# Patient Record
Sex: MALE | Race: WHITE | NOT HISPANIC OR LATINO | Employment: OTHER | URBAN - METROPOLITAN AREA
[De-identification: names, ages, dates, MRNs, and addresses within clinical notes are randomized per-mention and may not be internally consistent; named-entity substitution may affect disease eponyms.]

---

## 2017-09-30 ENCOUNTER — APPOINTMENT (OUTPATIENT)
Dept: RADIOLOGY | Facility: MEDICAL CENTER | Age: 37
End: 2017-09-30
Attending: EMERGENCY MEDICINE
Payer: COMMERCIAL

## 2017-09-30 ENCOUNTER — HOSPITAL ENCOUNTER (EMERGENCY)
Facility: MEDICAL CENTER | Age: 37
End: 2017-10-01
Attending: EMERGENCY MEDICINE
Payer: COMMERCIAL

## 2017-09-30 DIAGNOSIS — R11.2 NON-INTRACTABLE VOMITING WITH NAUSEA, UNSPECIFIED VOMITING TYPE: ICD-10-CM

## 2017-09-30 DIAGNOSIS — K43.9 VENTRAL HERNIA WITHOUT OBSTRUCTION OR GANGRENE: ICD-10-CM

## 2017-09-30 DIAGNOSIS — R10.13 EPIGASTRIC PAIN: ICD-10-CM

## 2017-09-30 DIAGNOSIS — F10.10 ALCOHOL ABUSE: ICD-10-CM

## 2017-09-30 LAB
ALBUMIN SERPL BCP-MCNC: 4.8 G/DL (ref 3.2–4.9)
ALBUMIN/GLOB SERPL: 1.5 G/DL
ALP SERPL-CCNC: 98 U/L (ref 30–99)
ALT SERPL-CCNC: 36 U/L (ref 2–50)
ANION GAP SERPL CALC-SCNC: 21 MMOL/L (ref 0–11.9)
AST SERPL-CCNC: 26 U/L (ref 12–45)
BASOPHILS # BLD AUTO: 0.4 % (ref 0–1.8)
BASOPHILS # BLD: 0.04 K/UL (ref 0–0.12)
BILIRUB SERPL-MCNC: 2.7 MG/DL (ref 0.1–1.5)
BUN SERPL-MCNC: 11 MG/DL (ref 8–22)
CALCIUM SERPL-MCNC: 10.1 MG/DL (ref 8.5–10.5)
CHLORIDE SERPL-SCNC: 101 MMOL/L (ref 96–112)
CO2 SERPL-SCNC: 17 MMOL/L (ref 20–33)
CREAT SERPL-MCNC: 0.71 MG/DL (ref 0.5–1.4)
EOSINOPHIL # BLD AUTO: 0 K/UL (ref 0–0.51)
EOSINOPHIL NFR BLD: 0 % (ref 0–6.9)
ERYTHROCYTE [DISTWIDTH] IN BLOOD BY AUTOMATED COUNT: 47.3 FL (ref 35.9–50)
GFR SERPL CREATININE-BSD FRML MDRD: >60 ML/MIN/1.73 M 2
GLOBULIN SER CALC-MCNC: 3.1 G/DL (ref 1.9–3.5)
GLUCOSE SERPL-MCNC: 81 MG/DL (ref 65–99)
HCT VFR BLD AUTO: 42.1 % (ref 42–52)
HGB BLD-MCNC: 13.7 G/DL (ref 14–18)
IMM GRANULOCYTES # BLD AUTO: 0.07 K/UL (ref 0–0.11)
IMM GRANULOCYTES NFR BLD AUTO: 0.6 % (ref 0–0.9)
INR PPP: 1.09 (ref 0.87–1.13)
LIPASE SERPL-CCNC: 16 U/L (ref 11–82)
LYMPHOCYTES # BLD AUTO: 1.09 K/UL (ref 1–4.8)
LYMPHOCYTES NFR BLD: 10 % (ref 22–41)
MCH RBC QN AUTO: 24.1 PG (ref 27–33)
MCHC RBC AUTO-ENTMCNC: 32.5 G/DL (ref 33.7–35.3)
MCV RBC AUTO: 74 FL (ref 81.4–97.8)
MONOCYTES # BLD AUTO: 0.49 K/UL (ref 0–0.85)
MONOCYTES NFR BLD AUTO: 4.5 % (ref 0–13.4)
NEUTROPHILS # BLD AUTO: 9.22 K/UL (ref 1.82–7.42)
NEUTROPHILS NFR BLD: 84.5 % (ref 44–72)
NRBC # BLD AUTO: 0 K/UL
NRBC BLD AUTO-RTO: 0 /100 WBC
PLATELET # BLD AUTO: 314 K/UL (ref 164–446)
PMV BLD AUTO: 10.1 FL (ref 9–12.9)
POTASSIUM SERPL-SCNC: 3.8 MMOL/L (ref 3.6–5.5)
PROT SERPL-MCNC: 7.9 G/DL (ref 6–8.2)
PROTHROMBIN TIME: 14.4 SEC (ref 12–14.6)
RBC # BLD AUTO: 5.69 M/UL (ref 4.7–6.1)
SODIUM SERPL-SCNC: 139 MMOL/L (ref 135–145)
WBC # BLD AUTO: 10.9 K/UL (ref 4.8–10.8)

## 2017-09-30 PROCEDURE — 83690 ASSAY OF LIPASE: CPT

## 2017-09-30 PROCEDURE — 80053 COMPREHEN METABOLIC PANEL: CPT

## 2017-09-30 PROCEDURE — 96374 THER/PROPH/DIAG INJ IV PUSH: CPT

## 2017-09-30 PROCEDURE — 96361 HYDRATE IV INFUSION ADD-ON: CPT

## 2017-09-30 PROCEDURE — 36415 COLL VENOUS BLD VENIPUNCTURE: CPT

## 2017-09-30 PROCEDURE — 96375 TX/PRO/DX INJ NEW DRUG ADDON: CPT

## 2017-09-30 PROCEDURE — 99284 EMERGENCY DEPT VISIT MOD MDM: CPT

## 2017-09-30 PROCEDURE — 85610 PROTHROMBIN TIME: CPT

## 2017-09-30 PROCEDURE — 85025 COMPLETE CBC W/AUTO DIFF WBC: CPT

## 2017-09-30 PROCEDURE — 700111 HCHG RX REV CODE 636 W/ 250 OVERRIDE (IP): Performed by: EMERGENCY MEDICINE

## 2017-09-30 PROCEDURE — 96376 TX/PRO/DX INJ SAME DRUG ADON: CPT

## 2017-09-30 PROCEDURE — 700105 HCHG RX REV CODE 258: Performed by: EMERGENCY MEDICINE

## 2017-09-30 RX ORDER — METOCLOPRAMIDE HYDROCHLORIDE 5 MG/ML
10 INJECTION INTRAMUSCULAR; INTRAVENOUS ONCE
Status: COMPLETED | OUTPATIENT
Start: 2017-09-30 | End: 2017-09-30

## 2017-09-30 RX ORDER — MORPHINE SULFATE 4 MG/ML
4 INJECTION, SOLUTION INTRAMUSCULAR; INTRAVENOUS ONCE
Status: COMPLETED | OUTPATIENT
Start: 2017-09-30 | End: 2017-09-30

## 2017-09-30 RX ORDER — ONDANSETRON 2 MG/ML
4 INJECTION INTRAMUSCULAR; INTRAVENOUS ONCE
Status: COMPLETED | OUTPATIENT
Start: 2017-09-30 | End: 2017-09-30

## 2017-09-30 RX ORDER — SODIUM CHLORIDE 9 MG/ML
1000 INJECTION, SOLUTION INTRAVENOUS ONCE
Status: COMPLETED | OUTPATIENT
Start: 2017-09-30 | End: 2017-09-30

## 2017-09-30 RX ADMIN — METOCLOPRAMIDE 10 MG: 5 INJECTION, SOLUTION INTRAMUSCULAR; INTRAVENOUS at 22:02

## 2017-09-30 RX ADMIN — SODIUM CHLORIDE 1000 ML: 9 INJECTION, SOLUTION INTRAVENOUS at 22:09

## 2017-09-30 RX ADMIN — ONDANSETRON 4 MG: 2 INJECTION INTRAMUSCULAR; INTRAVENOUS at 20:44

## 2017-09-30 RX ADMIN — SODIUM CHLORIDE 1000 ML: 9 INJECTION, SOLUTION INTRAVENOUS at 20:43

## 2017-09-30 RX ADMIN — MORPHINE SULFATE 4 MG: 4 INJECTION INTRAVENOUS at 20:45

## 2017-09-30 RX ADMIN — ONDANSETRON 4 MG: 2 INJECTION INTRAMUSCULAR; INTRAVENOUS at 22:45

## 2017-09-30 RX ADMIN — ONDANSETRON 4 MG: 2 INJECTION INTRAMUSCULAR; INTRAVENOUS at 22:35

## 2017-09-30 RX ADMIN — MORPHINE SULFATE 4 MG: 4 INJECTION INTRAVENOUS at 22:02

## 2017-10-01 VITALS
BODY MASS INDEX: 44.1 KG/M2 | HEART RATE: 88 BPM | TEMPERATURE: 98 F | DIASTOLIC BLOOD PRESSURE: 73 MMHG | HEIGHT: 71 IN | OXYGEN SATURATION: 96 % | RESPIRATION RATE: 18 BRPM | WEIGHT: 315 LBS | SYSTOLIC BLOOD PRESSURE: 109 MMHG

## 2017-10-01 PROCEDURE — 74177 CT ABD & PELVIS W/CONTRAST: CPT

## 2017-10-01 PROCEDURE — 700117 HCHG RX CONTRAST REV CODE 255: Performed by: EMERGENCY MEDICINE

## 2017-10-01 RX ORDER — ONDANSETRON 4 MG/1
4 TABLET, ORALLY DISINTEGRATING ORAL EVERY 8 HOURS PRN
Qty: 10 TAB | Refills: 0 | Status: SHIPPED | OUTPATIENT
Start: 2017-10-01 | End: 2020-01-21

## 2017-10-01 RX ORDER — OMEPRAZOLE 20 MG/1
20 CAPSULE, DELAYED RELEASE ORAL 2 TIMES DAILY
Qty: 60 CAP | Refills: 0 | Status: SHIPPED | OUTPATIENT
Start: 2017-10-01 | End: 2017-10-31

## 2017-10-01 RX ORDER — ONDANSETRON 4 MG/1
4 TABLET, ORALLY DISINTEGRATING ORAL EVERY 8 HOURS PRN
Qty: 10 TAB | Refills: 0 | Status: SHIPPED | OUTPATIENT
Start: 2017-10-01 | End: 2017-10-01

## 2017-10-01 RX ORDER — OMEPRAZOLE 20 MG/1
20 CAPSULE, DELAYED RELEASE ORAL 2 TIMES DAILY
Qty: 60 CAP | Refills: 0 | Status: SHIPPED | OUTPATIENT
Start: 2017-10-01 | End: 2017-10-01

## 2017-10-01 RX ADMIN — IOHEXOL 100 ML: 350 INJECTION, SOLUTION INTRAVENOUS at 00:19

## 2017-10-01 NOTE — ED NOTES
Pt states that he does not feel like he can sit up any more, asking to lay down. Pt assisted to senior margaritae with family, instructed on red phone use for assistance. Pt laying in a chair

## 2017-10-01 NOTE — ED NOTES
Estiven Hills Jr.  37 y.o.  Chief Complaint   Patient presents with   • Nausea/Vomiting/Diarrhea     Pt is end stage liver disease, needs a transplant, was sober for a year and then began drinking the last 2 days and is now suffering with n/v/d.      Pt is pale and hyperventilating in triage, states that he is afraid that he might seize. States that he has drank just over 2 gallons of whiskey in the last 48 hours. States that he has had withdrawal seizures in the past. Charge RN aware of pt.

## 2017-10-01 NOTE — ED NOTES
Pt stated that he was extremely nauseous and his stomach felt like it was on fire. 9/10 pain. ERP  Notified. Orders received. Pt medicated per MAR.

## 2017-10-01 NOTE — ED NOTES
Pt in room, changed in to gown. Monitors in place, VSS, call bell within reach, will continue to monitor.

## 2017-10-01 NOTE — DISCHARGE INSTRUCTIONS
Ventral Hernia  A ventral hernia (also called an incisional hernia) is a hernia that occurs at the site of a previous surgical cut (incision) in the abdomen. The abdominal wall spans from your lower chest down to your pelvis. If the abdominal wall is weakened from a surgical incision, a hernia can occur. A hernia is a bulge of bowel or muscle tissue pushing out on the weakened part of the abdominal wall. Ventral hernias can get bigger from straining or lifting.  Obese and older people are at higher risk for a ventral hernia. People who develop infections after surgery or require repeat incisions at the same site on the abdomen are also at increased risk.  CAUSES   A ventral hernia occurs because of weakness in the abdominal wall at an incision site.   SYMPTOMS   Common symptoms include:  · A visible bulge or lump on the abdominal wall.  · Pain or tenderness around the lump.  · Increased discomfort if you cough or make a sudden movement.  If the hernia has blocked part of the intestine, a serious complication can occur (incarcerated or strangulated hernia). This can become a problem that requires emergency surgery because the blood flow to the blocked intestine may be cut off. Symptoms may include:  · Feeling sick to your stomach (nauseous).  · Throwing up (vomiting).  · Stomach swelling (distention) or bloating.  · Fever.  · Rapid heartbeat.  DIAGNOSIS   Your health care provider will take a medical history and perform a physical exam. Various tests may be ordered, such as:  · Blood tests.  · Urine tests.  · Ultrasonography.  · X-rays.  · Computed tomography (CT).  TREATMENT   Watchful waiting may be all that is needed for a smaller hernia that does not cause symptoms. Your health care provider may recommend the use of a supportive belt (truss) that helps to keep the abdominal wall intact. For larger hernias or those that cause pain, surgery to repair the hernia is usually recommended. If a hernia becomes  strangulated, emergency surgery needs to be done right away.  HOME CARE INSTRUCTIONS  · Avoid putting pressure or strain on the abdominal area.  · Avoid heavy lifting.  · Use good body positioning for physical tasks. Ask your health care provider about proper body positioning.  · Use a supportive belt as directed by your health care provider.  · Maintain a healthy weight.  · Eat foods that are high in fiber, such as whole grains, fruits, and vegetables. Fiber helps prevent difficult bowel movements (constipation).  · Drink enough fluids to keep your urine clear or pale yellow.  · Follow up with your health care provider as directed.  SEEK MEDICAL CARE IF:   · Your hernia seems to be getting larger or more painful.  SEEK IMMEDIATE MEDICAL CARE IF:   · You have abdominal pain that is sudden and sharp.  · Your pain becomes severe.  · You have repeated vomiting.  · You are sweating a lot.  · You notice a rapid heartbeat.  · You develop a fever.  MAKE SURE YOU:   · Understand these instructions.  · Will watch your condition.  · Will get help right away if you are not doing well or get worse.     This information is not intended to replace advice given to you by your health care provider. Make sure you discuss any questions you have with your health care provider.     Document Released: 12/04/2013 Document Revised: 01/08/2016 Document Reviewed: 12/04/2013  [employer] Interactive Patient Education ©2016 [employer]  Alcoholic Gastritis  You have alcoholic gastritis. This is an inflammation of the lining of the stomach. It is caused by drinking alcohol. The symptoms may include: burning abdominal pain, nausea, vomiting or even vomiting blood. It can be made worse by a poor diet. People who drink frequently often do not eat well. Taking aspirin or other anti-inflammatory medications increases stomach irritation and bleeding. These medicines should be avoided.  Treatment is aimed at the cause. You have to stop drinking  alcohol if you want to get better. Eat a healthy, well-balanced diet. You may take liquid antacids as needed. Your caregiver may prescribe medications to help heal the stomach lining. Take these as prescribed.  PREVENTION   · Anyone who has experienced alcoholic gastritis should consider that alcoholism may be an issue. Professional evaluation is highly recommended.   · Although some people can recover without help, most need assistance. With treatment and support, many are able to stop drinking and rebuild their lives. Long-term recovery is possible.   · Alcohol Addiction cannot be cured, but it can be treated successfully. Treatment centers are listed in telephone listings under:   · Alcoholism and Addiction Treatment; Substance Abuse Treatment or Cocaine, Narcotics and Alcoholics Anonymous. Most hospitals and clinics can refer you to a specialized care center.   · The U[place] government maintains a toll-free number for treatment referrals: 1-127.973.3801 or 1-460.159.3818 (TDD). They also maintain a website: http://findtreatment.samhsa.gov. Other websites for more information are: www.mentalhealth.samhsa.gov and www.teri.gov.   · In [place], treatment resources are listed in each province. Listings are available under:   · [employer] or similar titles.   SEEK IMMEDIATE MEDICAL CARE IF:   · You develop severe abdominal pain, uncontrolled vomiting, or vomiting blood.   · You blackout or have fainting spells.   · You develop seizures (this could be life threatening).   · You develop bloody stools or stools that appear black or tarry.   Document Released: 01/25/2006 Document Revised: 03/11/2013 Document Reviewed: 12/22/2010  [employer]® Patient Information ©2013 [employer].

## 2018-04-23 ENCOUNTER — TELEPHONE (OUTPATIENT)
Dept: VASCULAR LAB | Facility: MEDICAL CENTER | Age: 38
End: 2018-04-23

## 2018-04-23 NOTE — TELEPHONE ENCOUNTER
Received a referral from [facility].  There is nothing in the chart note that was sent with referral to indicate that pt's on any anticoagulation.   Called and left msg for MA to clarify the referral.     Vivienne Espinoza, KetanD

## 2020-01-20 ENCOUNTER — APPOINTMENT (OUTPATIENT)
Dept: RADIOLOGY | Facility: MEDICAL CENTER | Age: 40
DRG: 896 | End: 2020-01-20
Attending: EMERGENCY MEDICINE
Payer: MEDICARE

## 2020-01-20 ENCOUNTER — HOSPITAL ENCOUNTER (INPATIENT)
Facility: MEDICAL CENTER | Age: 40
LOS: 2 days | DRG: 896 | End: 2020-01-23
Attending: EMERGENCY MEDICINE | Admitting: HOSPITALIST
Payer: MEDICARE

## 2020-01-20 DIAGNOSIS — R94.31 PROLONGED Q-T INTERVAL ON ECG: ICD-10-CM

## 2020-01-20 DIAGNOSIS — E87.6 HYPOKALEMIA: ICD-10-CM

## 2020-01-20 DIAGNOSIS — F15.10 METHAMPHETAMINE ABUSE (HCC): ICD-10-CM

## 2020-01-20 DIAGNOSIS — R09.89 CHOKING SENSATION: ICD-10-CM

## 2020-01-20 DIAGNOSIS — R79.89 INCREASED AMMONIA LEVEL: ICD-10-CM

## 2020-01-20 DIAGNOSIS — R44.3 HALLUCINATIONS: ICD-10-CM

## 2020-01-20 LAB
ALBUMIN SERPL BCP-MCNC: 3.7 G/DL (ref 3.2–4.9)
ALBUMIN/GLOB SERPL: 1.8 G/DL
ALP SERPL-CCNC: 118 U/L (ref 30–99)
ALT SERPL-CCNC: 60 U/L (ref 2–50)
AMMONIA PLAS-SCNC: 49 UMOL/L (ref 11–45)
ANION GAP SERPL CALC-SCNC: 11 MMOL/L (ref 0–11.9)
AST SERPL-CCNC: 88 U/L (ref 12–45)
BASOPHILS # BLD AUTO: 1 % (ref 0–1.8)
BASOPHILS # BLD: 0.04 K/UL (ref 0–0.12)
BILIRUB SERPL-MCNC: 4.2 MG/DL (ref 0.1–1.5)
BUN SERPL-MCNC: 9 MG/DL (ref 8–22)
CALCIUM SERPL-MCNC: 8.8 MG/DL (ref 8.5–10.5)
CHLORIDE SERPL-SCNC: 98 MMOL/L (ref 96–112)
CO2 SERPL-SCNC: 25 MMOL/L (ref 20–33)
CREAT SERPL-MCNC: 0.67 MG/DL (ref 0.5–1.4)
EOSINOPHIL # BLD AUTO: 0.08 K/UL (ref 0–0.51)
EOSINOPHIL NFR BLD: 2.1 % (ref 0–6.9)
ERYTHROCYTE [DISTWIDTH] IN BLOOD BY AUTOMATED COUNT: 53.8 FL (ref 35.9–50)
ETHANOL BLD-MCNC: 0.01 G/DL
GLOBULIN SER CALC-MCNC: 2.1 G/DL (ref 1.9–3.5)
GLUCOSE SERPL-MCNC: 76 MG/DL (ref 65–99)
HCT VFR BLD AUTO: 36.4 % (ref 42–52)
HGB BLD-MCNC: 12.8 G/DL (ref 14–18)
IMM GRANULOCYTES # BLD AUTO: 0.01 K/UL (ref 0–0.11)
IMM GRANULOCYTES NFR BLD AUTO: 0.3 % (ref 0–0.9)
LYMPHOCYTES # BLD AUTO: 1.14 K/UL (ref 1–4.8)
LYMPHOCYTES NFR BLD: 29.2 % (ref 22–41)
MAGNESIUM SERPL-MCNC: 1.6 MG/DL (ref 1.5–2.5)
MCH RBC QN AUTO: 37.6 PG (ref 27–33)
MCHC RBC AUTO-ENTMCNC: 35.2 G/DL (ref 33.7–35.3)
MCV RBC AUTO: 107.1 FL (ref 81.4–97.8)
MONOCYTES # BLD AUTO: 0.37 K/UL (ref 0–0.85)
MONOCYTES NFR BLD AUTO: 9.5 % (ref 0–13.4)
NEUTROPHILS # BLD AUTO: 2.26 K/UL (ref 1.82–7.42)
NEUTROPHILS NFR BLD: 57.9 % (ref 44–72)
NRBC # BLD AUTO: 0 K/UL
NRBC BLD-RTO: 0 /100 WBC
PHOSPHATE SERPL-MCNC: 3.1 MG/DL (ref 2.5–4.5)
PLATELET # BLD AUTO: 105 K/UL (ref 164–446)
PMV BLD AUTO: 11.1 FL (ref 9–12.9)
POTASSIUM SERPL-SCNC: 3 MMOL/L (ref 3.6–5.5)
PROT SERPL-MCNC: 5.8 G/DL (ref 6–8.2)
RBC # BLD AUTO: 3.4 M/UL (ref 4.7–6.1)
SODIUM SERPL-SCNC: 134 MMOL/L (ref 135–145)
WBC # BLD AUTO: 3.9 K/UL (ref 4.8–10.8)

## 2020-01-20 PROCEDURE — 80053 COMPREHEN METABOLIC PANEL: CPT

## 2020-01-20 PROCEDURE — 85025 COMPLETE CBC W/AUTO DIFF WBC: CPT

## 2020-01-20 PROCEDURE — 80307 DRUG TEST PRSMV CHEM ANLYZR: CPT

## 2020-01-20 PROCEDURE — 96366 THER/PROPH/DIAG IV INF ADDON: CPT

## 2020-01-20 PROCEDURE — 70360 X-RAY EXAM OF NECK: CPT

## 2020-01-20 PROCEDURE — 96375 TX/PRO/DX INJ NEW DRUG ADDON: CPT

## 2020-01-20 PROCEDURE — 83735 ASSAY OF MAGNESIUM: CPT

## 2020-01-20 PROCEDURE — 99285 EMERGENCY DEPT VISIT HI MDM: CPT

## 2020-01-20 PROCEDURE — 82140 ASSAY OF AMMONIA: CPT

## 2020-01-20 PROCEDURE — 96365 THER/PROPH/DIAG IV INF INIT: CPT

## 2020-01-20 PROCEDURE — 96367 TX/PROPH/DG ADDL SEQ IV INF: CPT

## 2020-01-20 PROCEDURE — 700111 HCHG RX REV CODE 636 W/ 250 OVERRIDE (IP): Performed by: EMERGENCY MEDICINE

## 2020-01-20 PROCEDURE — 93005 ELECTROCARDIOGRAM TRACING: CPT | Performed by: EMERGENCY MEDICINE

## 2020-01-20 PROCEDURE — 84100 ASSAY OF PHOSPHORUS: CPT

## 2020-01-20 PROCEDURE — 96368 THER/DIAG CONCURRENT INF: CPT

## 2020-01-20 RX ORDER — CHLORDIAZEPOXIDE HYDROCHLORIDE 25 MG/1
50 CAPSULE, GELATIN COATED ORAL EVERY 6 HOURS
Status: DISCONTINUED | OUTPATIENT
Start: 2020-01-21 | End: 2020-01-21

## 2020-01-20 RX ORDER — LORAZEPAM 2 MG/ML
1 INJECTION INTRAMUSCULAR
Status: DISCONTINUED | OUTPATIENT
Start: 2020-01-20 | End: 2020-01-21

## 2020-01-20 RX ORDER — FOLIC ACID 1 MG/1
1 TABLET ORAL DAILY
Status: DISCONTINUED | OUTPATIENT
Start: 2020-01-21 | End: 2020-01-21

## 2020-01-20 RX ORDER — CHLORDIAZEPOXIDE HYDROCHLORIDE 25 MG/1
25 CAPSULE, GELATIN COATED ORAL EVERY 6 HOURS
Status: DISCONTINUED | OUTPATIENT
Start: 2020-01-22 | End: 2020-01-21

## 2020-01-20 RX ORDER — LORAZEPAM 1 MG/1
1 TABLET ORAL EVERY 4 HOURS PRN
Status: DISCONTINUED | OUTPATIENT
Start: 2020-01-20 | End: 2020-01-21

## 2020-01-20 RX ORDER — LORAZEPAM 2 MG/1
2 TABLET ORAL
Status: DISCONTINUED | OUTPATIENT
Start: 2020-01-20 | End: 2020-01-21

## 2020-01-20 RX ORDER — LORAZEPAM 2 MG/ML
1.5 INJECTION INTRAMUSCULAR
Status: DISCONTINUED | OUTPATIENT
Start: 2020-01-20 | End: 2020-01-21

## 2020-01-20 RX ORDER — LORAZEPAM 2 MG/ML
0.5 INJECTION INTRAMUSCULAR EVERY 4 HOURS PRN
Status: DISCONTINUED | OUTPATIENT
Start: 2020-01-20 | End: 2020-01-21

## 2020-01-20 RX ORDER — LORAZEPAM 2 MG/ML
0.5 INJECTION INTRAMUSCULAR ONCE
Status: COMPLETED | OUTPATIENT
Start: 2020-01-20 | End: 2020-01-20

## 2020-01-20 RX ORDER — LORAZEPAM 2 MG/ML
2 INJECTION INTRAMUSCULAR
Status: DISCONTINUED | OUTPATIENT
Start: 2020-01-20 | End: 2020-01-21

## 2020-01-20 RX ORDER — LORAZEPAM 1 MG/1
0.5 TABLET ORAL EVERY 4 HOURS PRN
Status: DISCONTINUED | OUTPATIENT
Start: 2020-01-20 | End: 2020-01-21

## 2020-01-20 RX ORDER — THIAMINE MONONITRATE (VIT B1) 100 MG
100 TABLET ORAL DAILY
Status: DISCONTINUED | OUTPATIENT
Start: 2020-01-21 | End: 2020-01-21

## 2020-01-20 RX ORDER — LORAZEPAM 2 MG/1
4 TABLET ORAL
Status: DISCONTINUED | OUTPATIENT
Start: 2020-01-20 | End: 2020-01-21

## 2020-01-20 RX ORDER — HALOPERIDOL 5 MG/1
5 TABLET ORAL ONCE
Status: DISPENSED | OUTPATIENT
Start: 2020-01-20 | End: 2020-01-21

## 2020-01-20 RX ADMIN — LORAZEPAM 0.5 MG: 2 INJECTION INTRAMUSCULAR; INTRAVENOUS at 22:29

## 2020-01-20 ASSESSMENT — LIFESTYLE VARIABLES
ANXIETY: MODERATELY ANXIOUS OR GUARDED, SO ANXIETY IS INFERRED
HAVE YOU EVER FELT YOU SHOULD CUT DOWN ON YOUR DRINKING: YES
PAROXYSMAL SWEATS: BARELY PERCEPTIBLE SWEATING. PALMS MOIST
AGITATION: NORMAL ACTIVITY
VISUAL DISTURBANCES: NOT PRESENT
NAUSEA AND VOMITING: NO NAUSEA AND NO VOMITING
AUDITORY DISTURBANCES: NOT PRESENT
TREMOR: *
DO YOU DRINK ALCOHOL: YES
HAVE PEOPLE ANNOYED YOU BY CRITICIZING YOUR DRINKING: NO
DOES PATIENT WANT TO TALK TO SOMEONE ABOUT QUITTING: YES
TOTAL SCORE: 3
TOTAL SCORE: 3
EVER FELT BAD OR GUILTY ABOUT YOUR DRINKING: YES
EVER HAD A DRINK FIRST THING IN THE MORNING TO STEADY YOUR NERVES TO GET RID OF A HANGOVER: YES
CONSUMPTION TOTAL: INCOMPLETE
TOTAL SCORE: 3
TOTAL SCORE: 7
HEADACHE, FULLNESS IN HEAD: NOT PRESENT
ORIENTATION AND CLOUDING OF SENSORIUM: ORIENTED AND CAN DO SERIAL ADDITIONS
DOES PATIENT WANT TO STOP DRINKING: YES

## 2020-01-21 ENCOUNTER — APPOINTMENT (OUTPATIENT)
Dept: RADIOLOGY | Facility: MEDICAL CENTER | Age: 40
DRG: 896 | End: 2020-01-21
Attending: HOSPITALIST
Payer: MEDICARE

## 2020-01-21 ENCOUNTER — APPOINTMENT (OUTPATIENT)
Dept: RADIOLOGY | Facility: MEDICAL CENTER | Age: 40
DRG: 896 | End: 2020-01-21
Attending: EMERGENCY MEDICINE
Payer: MEDICARE

## 2020-01-21 PROBLEM — R44.3 HALLUCINATIONS: Status: ACTIVE | Noted: 2020-01-21

## 2020-01-21 PROBLEM — R94.31 PROLONGED QT INTERVAL: Status: ACTIVE | Noted: 2020-01-21

## 2020-01-21 PROBLEM — F10.10 ALCOHOL ABUSE: Status: ACTIVE | Noted: 2020-01-21

## 2020-01-21 PROBLEM — R74.01 TRANSAMINITIS: Status: ACTIVE | Noted: 2020-01-21

## 2020-01-21 LAB
AMPHET UR QL SCN: POSITIVE
BARBITURATES UR QL SCN: NEGATIVE
BENZODIAZ UR QL SCN: NEGATIVE
BZE UR QL SCN: NEGATIVE
CANNABINOIDS UR QL SCN: POSITIVE
EKG IMPRESSION: NORMAL
MAGNESIUM SERPL-MCNC: 2.1 MG/DL (ref 1.5–2.5)
METHADONE UR QL SCN: NEGATIVE
OPIATES UR QL SCN: NEGATIVE
OXYCODONE UR QL SCN: NEGATIVE
PCP UR QL SCN: NEGATIVE
PHOSPHATE SERPL-MCNC: 2.8 MG/DL (ref 2.5–4.5)
PROPOXYPH UR QL SCN: NEGATIVE

## 2020-01-21 PROCEDURE — 700101 HCHG RX REV CODE 250: Performed by: HOSPITALIST

## 2020-01-21 PROCEDURE — 74018 RADEX ABDOMEN 1 VIEW: CPT

## 2020-01-21 PROCEDURE — 93005 ELECTROCARDIOGRAM TRACING: CPT | Performed by: HOSPITALIST

## 2020-01-21 PROCEDURE — 93010 ELECTROCARDIOGRAM REPORT: CPT | Performed by: INTERNAL MEDICINE

## 2020-01-21 PROCEDURE — 700102 HCHG RX REV CODE 250 W/ 637 OVERRIDE(OP): Performed by: EMERGENCY MEDICINE

## 2020-01-21 PROCEDURE — 83735 ASSAY OF MAGNESIUM: CPT

## 2020-01-21 PROCEDURE — HZ2ZZZZ DETOXIFICATION SERVICES FOR SUBSTANCE ABUSE TREATMENT: ICD-10-PCS | Performed by: HOSPITALIST

## 2020-01-21 PROCEDURE — 700111 HCHG RX REV CODE 636 W/ 250 OVERRIDE (IP): Performed by: HOSPITALIST

## 2020-01-21 PROCEDURE — 36415 COLL VENOUS BLD VENIPUNCTURE: CPT

## 2020-01-21 PROCEDURE — A9270 NON-COVERED ITEM OR SERVICE: HCPCS | Performed by: EMERGENCY MEDICINE

## 2020-01-21 PROCEDURE — 700102 HCHG RX REV CODE 250 W/ 637 OVERRIDE(OP): Performed by: HOSPITALIST

## 2020-01-21 PROCEDURE — 95819 EEG AWAKE AND ASLEEP: CPT | Mod: 26 | Performed by: PSYCHIATRY & NEUROLOGY

## 2020-01-21 PROCEDURE — 700111 HCHG RX REV CODE 636 W/ 250 OVERRIDE (IP): Performed by: EMERGENCY MEDICINE

## 2020-01-21 PROCEDURE — 71045 X-RAY EXAM CHEST 1 VIEW: CPT

## 2020-01-21 PROCEDURE — 4A10X4Z MONITORING OF CENTRAL NERVOUS ELECTRICAL ACTIVITY, EXTERNAL APPROACH: ICD-10-PCS | Performed by: PSYCHIATRY & NEUROLOGY

## 2020-01-21 PROCEDURE — A9270 NON-COVERED ITEM OR SERVICE: HCPCS | Performed by: HOSPITALIST

## 2020-01-21 PROCEDURE — 95819 EEG AWAKE AND ASLEEP: CPT | Performed by: PSYCHIATRY & NEUROLOGY

## 2020-01-21 PROCEDURE — 700117 HCHG RX CONTRAST REV CODE 255: Performed by: EMERGENCY MEDICINE

## 2020-01-21 PROCEDURE — 99223 1ST HOSP IP/OBS HIGH 75: CPT | Performed by: HOSPITALIST

## 2020-01-21 PROCEDURE — 770020 HCHG ROOM/CARE - TELE (206)

## 2020-01-21 PROCEDURE — 70250 X-RAY EXAM OF SKULL: CPT

## 2020-01-21 PROCEDURE — 84100 ASSAY OF PHOSPHORUS: CPT

## 2020-01-21 PROCEDURE — 70491 CT SOFT TISSUE NECK W/DYE: CPT

## 2020-01-21 RX ORDER — LORAZEPAM 2 MG/ML
2 INJECTION INTRAMUSCULAR ONCE
Status: COMPLETED | OUTPATIENT
Start: 2020-01-21 | End: 2020-01-21

## 2020-01-21 RX ORDER — POTASSIUM CHLORIDE 7.45 MG/ML
10 INJECTION INTRAVENOUS
Status: DISPENSED | OUTPATIENT
Start: 2020-01-21 | End: 2020-01-21

## 2020-01-21 RX ORDER — THIAMINE MONONITRATE (VIT B1) 100 MG
100 TABLET ORAL DAILY
Status: DISCONTINUED | OUTPATIENT
Start: 2020-01-22 | End: 2020-01-23 | Stop reason: HOSPADM

## 2020-01-21 RX ORDER — AMOXICILLIN 250 MG
2 CAPSULE ORAL 2 TIMES DAILY
Status: DISCONTINUED | OUTPATIENT
Start: 2020-01-21 | End: 2020-01-23 | Stop reason: HOSPADM

## 2020-01-21 RX ORDER — LORAZEPAM 2 MG/ML
0.5 INJECTION INTRAMUSCULAR EVERY 4 HOURS PRN
Status: DISCONTINUED | OUTPATIENT
Start: 2020-01-21 | End: 2020-01-23 | Stop reason: HOSPADM

## 2020-01-21 RX ORDER — MAGNESIUM SULFATE HEPTAHYDRATE 40 MG/ML
2 INJECTION, SOLUTION INTRAVENOUS ONCE
Status: COMPLETED | OUTPATIENT
Start: 2020-01-21 | End: 2020-01-21

## 2020-01-21 RX ORDER — LORAZEPAM 2 MG/1
2 TABLET ORAL
Status: DISCONTINUED | OUTPATIENT
Start: 2020-01-21 | End: 2020-01-23 | Stop reason: HOSPADM

## 2020-01-21 RX ORDER — LORAZEPAM 2 MG/ML
1.5 INJECTION INTRAMUSCULAR
Status: DISCONTINUED | OUTPATIENT
Start: 2020-01-21 | End: 2020-01-23 | Stop reason: HOSPADM

## 2020-01-21 RX ORDER — BISACODYL 10 MG
10 SUPPOSITORY, RECTAL RECTAL
Status: DISCONTINUED | OUTPATIENT
Start: 2020-01-21 | End: 2020-01-23 | Stop reason: HOSPADM

## 2020-01-21 RX ORDER — LABETALOL 200 MG/1
200 TABLET, FILM COATED ORAL EVERY 6 HOURS PRN
Status: DISCONTINUED | OUTPATIENT
Start: 2020-01-21 | End: 2020-01-23 | Stop reason: HOSPADM

## 2020-01-21 RX ORDER — POTASSIUM CHLORIDE 20 MEQ/1
40 TABLET, EXTENDED RELEASE ORAL ONCE
Status: COMPLETED | OUTPATIENT
Start: 2020-01-21 | End: 2020-01-21

## 2020-01-21 RX ORDER — POLYETHYLENE GLYCOL 3350 17 G/17G
1 POWDER, FOR SOLUTION ORAL
Status: DISCONTINUED | OUTPATIENT
Start: 2020-01-21 | End: 2020-01-23 | Stop reason: HOSPADM

## 2020-01-21 RX ORDER — LORAZEPAM 2 MG/ML
2 INJECTION INTRAMUSCULAR
Status: DISCONTINUED | OUTPATIENT
Start: 2020-01-21 | End: 2020-01-23 | Stop reason: HOSPADM

## 2020-01-21 RX ORDER — LORAZEPAM 1 MG/1
1 TABLET ORAL EVERY 4 HOURS PRN
Status: DISCONTINUED | OUTPATIENT
Start: 2020-01-21 | End: 2020-01-23 | Stop reason: HOSPADM

## 2020-01-21 RX ORDER — LORAZEPAM 2 MG/ML
1 INJECTION INTRAMUSCULAR
Status: DISCONTINUED | OUTPATIENT
Start: 2020-01-21 | End: 2020-01-23 | Stop reason: HOSPADM

## 2020-01-21 RX ORDER — LORAZEPAM 0.5 MG/1
0.5 TABLET ORAL EVERY 4 HOURS PRN
Status: DISCONTINUED | OUTPATIENT
Start: 2020-01-21 | End: 2020-01-23 | Stop reason: HOSPADM

## 2020-01-21 RX ORDER — LABETALOL HYDROCHLORIDE 5 MG/ML
10 INJECTION, SOLUTION INTRAVENOUS
Status: DISCONTINUED | OUTPATIENT
Start: 2020-01-21 | End: 2020-01-23 | Stop reason: HOSPADM

## 2020-01-21 RX ORDER — LORAZEPAM 2 MG/1
4 TABLET ORAL
Status: DISCONTINUED | OUTPATIENT
Start: 2020-01-21 | End: 2020-01-23 | Stop reason: HOSPADM

## 2020-01-21 RX ORDER — POTASSIUM CHLORIDE 7.45 MG/ML
10 INJECTION INTRAVENOUS
Status: DISCONTINUED | OUTPATIENT
Start: 2020-01-21 | End: 2020-01-21

## 2020-01-21 RX ORDER — LACTULOSE 20 G/30ML
15 SOLUTION ORAL 2 TIMES DAILY
Status: DISCONTINUED | OUTPATIENT
Start: 2020-01-21 | End: 2020-01-22

## 2020-01-21 RX ORDER — FOLIC ACID 1 MG/1
1 TABLET ORAL DAILY
Status: DISCONTINUED | OUTPATIENT
Start: 2020-01-22 | End: 2020-01-23 | Stop reason: HOSPADM

## 2020-01-21 RX ADMIN — LORAZEPAM 1.5 MG: 2 INJECTION INTRAMUSCULAR; INTRAVENOUS at 14:55

## 2020-01-21 RX ADMIN — LORAZEPAM 4 MG: 2 TABLET ORAL at 07:38

## 2020-01-21 RX ADMIN — LORAZEPAM 1.5 MG: 2 INJECTION INTRAMUSCULAR; INTRAVENOUS at 04:02

## 2020-01-21 RX ADMIN — LORAZEPAM 1 MG: 2 INJECTION INTRAMUSCULAR; INTRAVENOUS at 17:38

## 2020-01-21 RX ADMIN — THIAMINE HYDROCHLORIDE: 100 INJECTION, SOLUTION INTRAMUSCULAR; INTRAVENOUS at 01:41

## 2020-01-21 RX ADMIN — POTASSIUM CHLORIDE 10 MEQ: 7.46 INJECTION, SOLUTION INTRAVENOUS at 03:14

## 2020-01-21 RX ADMIN — LORAZEPAM 1 MG: 2 INJECTION INTRAMUSCULAR; INTRAVENOUS at 13:41

## 2020-01-21 RX ADMIN — LORAZEPAM 3 MG: 2 TABLET ORAL at 19:44

## 2020-01-21 RX ADMIN — LORAZEPAM 4 MG: 2 TABLET ORAL at 18:56

## 2020-01-21 RX ADMIN — LORAZEPAM 1 MG: 2 INJECTION INTRAMUSCULAR; INTRAVENOUS at 11:07

## 2020-01-21 RX ADMIN — LORAZEPAM 2 MG: 2 INJECTION INTRAMUSCULAR; INTRAVENOUS at 12:05

## 2020-01-21 RX ADMIN — LORAZEPAM 2 MG: 2 INJECTION INTRAMUSCULAR; INTRAVENOUS at 08:28

## 2020-01-21 RX ADMIN — MAGNESIUM SULFATE 2 G: 2 INJECTION INTRAVENOUS at 00:30

## 2020-01-21 RX ADMIN — POTASSIUM CHLORIDE 10 MEQ: 7.46 INJECTION, SOLUTION INTRAVENOUS at 01:21

## 2020-01-21 RX ADMIN — LORAZEPAM 2 MG: 2 INJECTION INTRAMUSCULAR; INTRAVENOUS at 07:00

## 2020-01-21 RX ADMIN — IOHEXOL 80 ML: 350 INJECTION, SOLUTION INTRAVENOUS at 01:19

## 2020-01-21 RX ADMIN — POTASSIUM CHLORIDE 40 MEQ: 1500 TABLET, EXTENDED RELEASE ORAL at 01:21

## 2020-01-21 RX ADMIN — LORAZEPAM 4 MG: 2 TABLET ORAL at 08:01

## 2020-01-21 RX ADMIN — LACTULOSE 15 ML: 20 SOLUTION ORAL at 02:18

## 2020-01-21 RX ADMIN — LORAZEPAM 2 MG: 2 INJECTION INTRAMUSCULAR; INTRAVENOUS at 18:35

## 2020-01-21 RX ADMIN — LORAZEPAM 1 MG: 2 INJECTION INTRAMUSCULAR; INTRAVENOUS at 02:18

## 2020-01-21 RX ADMIN — LORAZEPAM 1.5 MG: 2 INJECTION INTRAMUSCULAR; INTRAVENOUS at 03:03

## 2020-01-21 ASSESSMENT — LIFESTYLE VARIABLES
TOTAL SCORE: 21
EVER FELT BAD OR GUILTY ABOUT YOUR DRINKING: NO
ANXIETY: *
HEADACHE, FULLNESS IN HEAD: NOT PRESENT
PAROXYSMAL SWEATS: *
NAUSEA AND VOMITING: NO NAUSEA AND NO VOMITING
PAROXYSMAL SWEATS: BARELY PERCEPTIBLE SWEATING. PALMS MOIST
VISUAL DISTURBANCES: MODERATE SENSITIVITY
VISUAL DISTURBANCES: MODERATE SENSITIVITY
ORIENTATION AND CLOUDING OF SENSORIUM: ORIENTED AND CAN DO SERIAL ADDITIONS
NAUSEA AND VOMITING: MILD NAUSEA WITH NO VOMITING
TREMOR: SEVERE TREMOR, EVEN WITH ARMS NOT EXTENDED
HEADACHE, FULLNESS IN HEAD: MODERATE
TREMOR: *
AUDITORY DISTURBANCES: MILD HARSHNESS OR ABILITY TO FRIGHTEN
ANXIETY: NO ANXIETY (AT EASE)
HEADACHE, FULLNESS IN HEAD: NOT PRESENT
HEADACHE, FULLNESS IN HEAD: NOT PRESENT
AGITATION: *
ANXIETY: *
VISUAL DISTURBANCES: SEVERE HALLUCINATIONS
TOTAL SCORE: 38
ANXIETY: *
TOTAL SCORE: MILD ITCHING, PINS AND NEEDLES SENSATION, BURNING OR NUMBNESS
TREMOR: *
ALCOHOL_USE: YES
PAROXYSMAL SWEATS: *
ANXIETY: *
AUDITORY DISTURBANCES: NOT PRESENT
VISUAL DISTURBANCES: SEVERE HALLUCINATIONS
TOTAL SCORE: MILD ITCHING, PINS AND NEEDLES SENSATION, BURNING OR NUMBNESS
TOTAL SCORE: 22
AGITATION: *
TOTAL SCORE: 24
ANXIETY: *
AUDITORY DISTURBANCES: SEVERE HALLUCINATIONS
PAROXYSMAL SWEATS: *
TREMOR: MODERATE TREMOR WITH ARMS EXTENDED
AUDITORY DISTURBANCES: SEVERE HALLUCINATIONS
AUDITORY DISTURBANCES: NOT PRESENT
PAROXYSMAL SWEATS: *
AGITATION: NORMAL ACTIVITY
TOTAL SCORE: 39
AGITATION: MODERATELY FIDGETY AND RESTLESS
AUDITORY DISTURBANCES: MODERATE HARSHNESS OR ABILITY TO FRIGHTEN
TREMOR: *
TOTAL SCORE: MODERATELY SEVERE HALLUCINATIONS
HAVE PEOPLE ANNOYED YOU BY CRITICIZING YOUR DRINKING: NO
VISUAL DISTURBANCES: SEVERE HALLUCINATIONS
AGITATION: *
AUDITORY DISTURBANCES: MILD HARSHNESS OR ABILITY TO FRIGHTEN
PAROXYSMAL SWEATS: BEADS OF SWEAT OBVIOUS ON FOREHEAD
ANXIETY: *
ANXIETY: *
TOTAL SCORE: MILD ITCHING, PINS AND NEEDLES SENSATION, BURNING OR NUMBNESS
AGITATION: MODERATELY FIDGETY AND RESTLESS
AGITATION: MODERATELY FIDGETY AND RESTLESS
ANXIETY: *
NAUSEA AND VOMITING: MILD NAUSEA WITH NO VOMITING
TOTAL SCORE: 5
HEADACHE, FULLNESS IN HEAD: VERY MILD
AGITATION: MODERATELY FIDGETY AND RESTLESS
ORIENTATION AND CLOUDING OF SENSORIUM: DATE DISORIENTATION BY MORE THAN TWO CALENDAR DAYS
ORIENTATION AND CLOUDING OF SENSORIUM: DISORIENTED FOR PLACE AND / OR PERSON
TREMOR: MODERATE TREMOR WITH ARMS EXTENDED
NAUSEA AND VOMITING: MILD NAUSEA WITH NO VOMITING
TOTAL SCORE: MILD ITCHING, PINS AND NEEDLES SENSATION, BURNING OR NUMBNESS
HEADACHE, FULLNESS IN HEAD: MILD
TOTAL SCORE: 12
VISUAL DISTURBANCES: MODERATE SENSITIVITY
ON A TYPICAL DAY WHEN YOU DRINK ALCOHOL HOW MANY DRINKS DO YOU HAVE: 3
PAROXYSMAL SWEATS: BARELY PERCEPTIBLE SWEATING. PALMS MOIST
TREMOR: MODERATE TREMOR WITH ARMS EXTENDED
PAROXYSMAL SWEATS: *
AUDITORY DISTURBANCES: MODERATE HARSHNESS OR ABILITY TO FRIGHTEN
ORIENTATION AND CLOUDING OF SENSORIUM: ORIENTED AND CAN DO SERIAL ADDITIONS
TOTAL SCORE: 22
TOTAL SCORE: MILD ITCHING, PINS AND NEEDLES SENSATION, BURNING OR NUMBNESS
AUDITORY DISTURBANCES: NOT PRESENT
HEADACHE, FULLNESS IN HEAD: NOT PRESENT
TREMOR: SEVERE TREMOR, EVEN WITH ARMS NOT EXTENDED
AGITATION: MODERATELY FIDGETY AND RESTLESS
ANXIETY: MODERATELY ANXIOUS OR GUARDED, SO ANXIETY IS INFERRED
ORIENTATION AND CLOUDING OF SENSORIUM: ORIENTED AND CAN DO SERIAL ADDITIONS
PAROXYSMAL SWEATS: NO SWEAT VISIBLE
NAUSEA AND VOMITING: MILD NAUSEA WITH NO VOMITING
ORIENTATION AND CLOUDING OF SENSORIUM: DATE DISORIENTATION BY MORE THAN TWO CALENDAR DAYS
AUDITORY DISTURBANCES: SEVERE HALLUCINATIONS
VISUAL DISTURBANCES: MODERATE SENSITIVITY
HEADACHE, FULLNESS IN HEAD: MODERATE
HEADACHE, FULLNESS IN HEAD: MODERATE
HEADACHE, FULLNESS IN HEAD: VERY MILD
HEADACHE, FULLNESS IN HEAD: MODERATE
TOTAL SCORE: VERY MILD ITCHING, PINS AND NEEDLES SENSATION, BURNING OR NUMBNESS
TREMOR: *
TREMOR: *
TOTAL SCORE: 1
ORIENTATION AND CLOUDING OF SENSORIUM: ORIENTED AND CAN DO SERIAL ADDITIONS
VISUAL DISTURBANCES: NOT PRESENT
NAUSEA AND VOMITING: NO NAUSEA AND NO VOMITING
PAROXYSMAL SWEATS: BARELY PERCEPTIBLE SWEATING. PALMS MOIST
TOTAL SCORE: 14
EVER_SMOKED: NEVER
HEADACHE, FULLNESS IN HEAD: NOT PRESENT
ANXIETY: MODERATELY ANXIOUS OR GUARDED, SO ANXIETY IS INFERRED
NAUSEA AND VOMITING: MILD NAUSEA WITH NO VOMITING
NAUSEA AND VOMITING: MILD NAUSEA WITH NO VOMITING
CONSUMPTION TOTAL: POSITIVE
ORIENTATION AND CLOUDING OF SENSORIUM: DATE DISORIENTATION BY MORE THAN TWO CALENDAR DAYS
NAUSEA AND VOMITING: MILD NAUSEA WITH NO VOMITING
TOTAL SCORE: 34
VISUAL DISTURBANCES: MODERATE SENSITIVITY
HOW MANY TIMES IN THE PAST YEAR HAVE YOU HAD 5 OR MORE DRINKS IN A DAY: 5
VISUAL DISTURBANCES: VERY MILD SENSITIVITY
AGITATION: *
EVER HAD A DRINK FIRST THING IN THE MORNING TO STEADY YOUR NERVES TO GET RID OF A HANGOVER: NO
NAUSEA AND VOMITING: MILD NAUSEA WITH NO VOMITING
AUDITORY DISTURBANCES: MILD HARSHNESS OR ABILITY TO FRIGHTEN
PAROXYSMAL SWEATS: BARELY PERCEPTIBLE SWEATING. PALMS MOIST
AGITATION: MODERATELY FIDGETY AND RESTLESS
TOTAL SCORE: 13
TREMOR: MODERATE TREMOR WITH ARMS EXTENDED
ORIENTATION AND CLOUDING OF SENSORIUM: DISORIENTED FOR PLACE AND / OR PERSON
NAUSEA AND VOMITING: MILD NAUSEA WITH NO VOMITING
ANXIETY: MODERATELY ANXIOUS OR GUARDED, SO ANXIETY IS INFERRED
TREMOR: MODERATE TREMOR WITH ARMS EXTENDED
PAROXYSMAL SWEATS: *
AVERAGE NUMBER OF DAYS PER WEEK YOU HAVE A DRINK CONTAINING ALCOHOL: 5
AGITATION: NORMAL ACTIVITY
HEADACHE, FULLNESS IN HEAD: MODERATE
AGITATION: SOMEWHAT MORE THAN NORMAL ACTIVITY
ORIENTATION AND CLOUDING OF SENSORIUM: ORIENTED AND CAN DO SERIAL ADDITIONS
TOTAL SCORE: 15
TOTAL SCORE: SEVERE HALLUCINATIONS
VISUAL DISTURBANCES: NOT PRESENT
ORIENTATION AND CLOUDING OF SENSORIUM: DATE DISORIENTATION BY MORE THAN TWO CALENDAR DAYS
AUDITORY DISTURBANCES: NOT PRESENT
AUDITORY DISTURBANCES: SEVERE HALLUCINATIONS
TREMOR: SEVERE TREMOR, EVEN WITH ARMS NOT EXTENDED
ORIENTATION AND CLOUDING OF SENSORIUM: ORIENTED AND CAN DO SERIAL ADDITIONS
TOTAL SCORE: 1
TOTAL SCORE: 1
TOTAL SCORE: MILD ITCHING, PINS AND NEEDLES SENSATION, BURNING OR NUMBNESS
HAVE YOU EVER FELT YOU SHOULD CUT DOWN ON YOUR DRINKING: YES
ORIENTATION AND CLOUDING OF SENSORIUM: ORIENTED AND CAN DO SERIAL ADDITIONS
NAUSEA AND VOMITING: MILD NAUSEA WITH NO VOMITING
NAUSEA AND VOMITING: NO NAUSEA AND NO VOMITING
VISUAL DISTURBANCES: MODERATE SENSITIVITY
TOTAL SCORE: MODERATE ITCHING, PINS AND NEEDLES SENSATION, BURNING OR NUMBNESS
VISUAL DISTURBANCES: MODERATE SENSITIVITY
PAROXYSMAL SWEATS: BARELY PERCEPTIBLE SWEATING. PALMS MOIST
TOTAL SCORE: 39
ANXIETY: *

## 2020-01-21 ASSESSMENT — COGNITIVE AND FUNCTIONAL STATUS - GENERAL
SUGGESTED CMS G CODE MODIFIER MOBILITY: CH
MOBILITY SCORE: 24
DAILY ACTIVITIY SCORE: 24
SUGGESTED CMS G CODE MODIFIER DAILY ACTIVITY: CH

## 2020-01-21 ASSESSMENT — ENCOUNTER SYMPTOMS
DIZZINESS: 0
VOMITING: 0
SORE THROAT: 0
NAUSEA: 1
FEVER: 0
TINGLING: 0
HALLUCINATIONS: 1
MYALGIAS: 0
SHORTNESS OF BREATH: 0
DIARRHEA: 0
ABDOMINAL PAIN: 0
CHILLS: 0
COUGH: 0
PALPITATIONS: 1
WHEEZING: 0
HEADACHES: 0
FOCAL WEAKNESS: 0
PHOTOPHOBIA: 0
DEPRESSION: 0

## 2020-01-21 ASSESSMENT — PATIENT HEALTH QUESTIONNAIRE - PHQ9
SUM OF ALL RESPONSES TO PHQ9 QUESTIONS 1 AND 2: 0
1. LITTLE INTEREST OR PLEASURE IN DOING THINGS: NOT AT ALL
2. FEELING DOWN, DEPRESSED, IRRITABLE, OR HOPELESS: NOT AT ALL

## 2020-01-21 NOTE — ASSESSMENT & PLAN NOTE
This is a new finding, other than methamphetamine the patient denies any illicit drug use.  He has hypokalemia and low normal magnesium both of which will be corrected.  We will continue to monitor closely on telemetry and trend EKG.  I will also check a urine drug screen and trend troponin levels.  Patient denies any chest pain.

## 2020-01-21 NOTE — ASSESSMENT & PLAN NOTE
Likely due to poor p.o. intake, magnesium is low normal but given his prolonged QT, both will be corrected.  Continue to monitor on telemetry and obtain serial EKGs.

## 2020-01-21 NOTE — ASSESSMENT & PLAN NOTE
Likely a psychotic episode from methamphetamine use-patient does not describe this that is bugs crawling which is indicative of alcohol withdrawal

## 2020-01-21 NOTE — ED NOTES
Med rec updated and complete. Allergies reviewed. Pt is not currently taking medications. Removed medications from med rec  Listed  [facility].

## 2020-01-21 NOTE — PROGRESS NOTES
"0300:  Patient became agitated and was non-compliant. Pt pulled out both IVs, tele box and stated, \"I don't want any of this. You don't have consent to do this to me.\" Pt was A/Ox1, disoriented to place, time and event. Pt stated he was on a \"spaceship.\" Two successful IV placements in place by RN. IV Ativan 1mg and 1.5mg given.    0400:  Patient pulled out both IVs and was jumping out of bed. Pt was getting aggressive and kept asking why he was here. Stated he \"it feels like I'm locked in a nursing home.\" Called security to help de-escalate. Re-assured patient that we're here to take care of him and to help him get better. Patient was arguing and yelling. Paged MD Real, orders put in for bilateral soft wrist restraints. Stated to give Lorazepam tablet until there's an IV access. Security stated to call again if patient is uncooperative. Informed and updated NOC charge RNFiona.     1210-0198:  Patient got out of bed, pulled out from soft restraints twice. Pt was combative, non-compliant and kept stating, \"I want to leave this place.\" Pt was in the hallway. Called security and walked him back to his room. Paged MD Tapia for updates/orders. Informed Dr. Tapia pt has no IV access, is uncooperative, security is in the room and orders are in for bilateral soft wrist restraints. Dr. Tapia stated to \"give Ativan 2mg IM and monitor patient. Once patient is calm and non-combative, obtain IV access and continue CIWA protocol. If patient continues to be aggressive and non-compliant, page security and hospitalist for possible ICU transfer.\" Ativan 2mg IM given and soft restraints are in place. Patient is now sitting edge of bed, eating breakfast. Report and updates given to Yosvany CHAVARRIA.         "

## 2020-01-21 NOTE — ED TRIAGE NOTES
"Chief Complaint   Patient presents with   • Drug Abuse     used meth x3 days this weekend for the first time    • Hallucinations     auditory and visual hallucinations       Pt presents to ed for auditory and visual hallucinations followin meth use for three days this weekend. Pt reports never using meth before.      Pt endorses thc use pta for anxiety.   Charge RN aware of pt, pt to G39.   /97   Pulse (!) 112   Temp 36.7 °C (98.1 °F) (Temporal)   Resp 19   Ht 1.803 m (5' 11\")   Wt 118.2 kg (260 lb 9.3 oz)   SpO2 98%   BMI 36.34 kg/m²    "

## 2020-01-21 NOTE — PROGRESS NOTES
2 RN skin check complete w/ Steven RN.   Devices in place n/a.  Skin assessed under devices n/a.  Confirmed pressure ulcers found on n/a.  New potential pressure ulcers noted on n/a. Wound consult placed n/a.    The following interventions in place: pillows in use for support, patient turns self. Patient is on CIWA protocol, padding on bedside rails in place.

## 2020-01-21 NOTE — ASSESSMENT & PLAN NOTE
Patient is still tachycardic, this could be confounded by his methamphetamine abuse.  Nevertheless, we will monitor him closely on the CIWA protocol, correct electrolytes, control blood pressure and start him on a rally bag with transition to oral supplements in the morning.  Patient current CIWA scores of 5-patient still complaining of hallucinations

## 2020-01-21 NOTE — ED PROVIDER NOTES
ED Provider Note    Scribed for Estrada Reddy M.D. by Melissa Mathew. 1/20/2020, 9:28 PM.    Primary care provider: None  Means of arrival: Walk-In  History obtained from: Patient  History limited by: None    CHIEF COMPLAINT  Chief Complaint   Patient presents with   • Drug Abuse     used meth x3 days this weekend for the first time    • Hallucinations     auditory and visual hallucinations        HPI  Estiven Hills Jr. is a 39 y.o. male with a history of alcoholism and liver disease who presents to the Emergency Department for auditory, visual, and tactile hallucinations secondary to methamphetamine use 3 days ago. Patient denies prior use of methamphetamine and states he first used 3 days ago. He stated that he started hearing voices that were whispering and calling to him today that were not there. He also hears his phone ringing when it's not ringing. He was seen at [facility] yesterday for tactile hallucinations during a panic attack and was then given Ativan and B-12 which alleviated his symptoms for a couple of hours. He has associated symptoms of shaking during alcohol withdrawals, throat tightening, and has post intoxicated emesis. He denies seizures. He also binge drinks alcohol 3 times a month and reports history of withdrawal symptoms. He denies any history of GI ulcers, suicidal ideation, or homicidal ideation. He states he had never smoked marijuana until he was 37 and that he drinks 1.5 bottles of whiskey everyday. He currently has active auditory hallucinations.     REVIEW OF SYSTEMS  Pertinent positives include visual, auditory, and tactile hallucinations, shaking, throat tightening, post intoxication emesis. Pertinent negatives include seizures. All other systems negative.    PAST MEDICAL HISTORY   has a past medical history of Alcohol abuse, Dyslipidemia, and HTN (hypertension).    SURGICAL HISTORY   has a past surgical history that includes exploratory laparotomy (N/A, 5/11/2016);  "other cardiac surgery; and irrigation & debridement general (Right, 5/27/2016).    SOCIAL HISTORY  Social History     Tobacco Use   • Smoking status: Never Smoker   • Smokeless tobacco: Never Used   Substance Use Topics   • Alcohol use: Yes     Alcohol/week: 0.0 oz     Comment: Daily alcohol use, drinks 1/5 vodka daily and sometimes more   • Drug use: No      Social History     Substance and Sexual Activity   Drug Use No       FAMILY HISTORY  Family History   Problem Relation Age of Onset   • Alcohol/Drug Mother    • Alcohol/Drug Brother        CURRENT MEDICATIONS  Current Outpatient Medications:   •  ondansetron (ZOFRAN ODT) 4 MG TABLET DISPERSIBLE, Take 1 Tab by mouth every 8 hours as needed for Nausea/Vomiting., Disp: 10 Tab, Rfl: 0  •  trazodone (DESYREL) 150 MG Tab, Take 600 mg by mouth every evening. Indications: Trouble Sleeping, Disp: , Rfl:       ALLERGIES  Allergies   Allergen Reactions   • Codeine Vomiting   • Vicodin [Hydrocodone-Acetaminophen] Anxiety       PHYSICAL EXAM  VITAL SIGNS: /97   Pulse (!) 112   Temp 36.7 °C (98.1 °F) (Temporal)   Resp 19   Ht 1.803 m (5' 11\")   Wt 118.2 kg (260 lb 9.3 oz)   SpO2 98%   BMI 36.34 kg/m²     Constitutional: Well developed, Well nourished, mild distress.   HENT: Normocephalic, Atraumatic, Oropharynx moist.   Eyes: Scleral Icterus  Neck: Supple, No stridor  Cardiovascular: Normal heart rate, Normal rhythm, No murmurs, equal pulses.   Pulmonary: Normal breath sounds, No respiratory distress, No wheezing, No rales, No rhonchi.  Chest: No chest wall tenderness or deformity.   Abdomen:Soft, No tenderness, No masses, no rebound, no guarding, obese  Back: No CVA tenderness.   Musculoskeletal: No major deformities noted, No tenderness. Asterixis with arms extension.  Skin: Warm, Dry, No erythema, No rash. Jaundiced.  Neurologic: Alert & oriented x 3, Normal motor function,  No focal deficits noted.   Psychiatric: Visual, Auditory and tactile hallucinations. "     LABS  Results for orders placed or performed during the hospital encounter of 01/20/20   CBC WITH DIFFERENTIAL   Result Value Ref Range    WBC 3.9 (L) 4.8 - 10.8 K/uL    RBC 3.40 (L) 4.70 - 6.10 M/uL    Hemoglobin 12.8 (L) 14.0 - 18.0 g/dL    Hematocrit 36.4 (L) 42.0 - 52.0 %    .1 (H) 81.4 - 97.8 fL    MCH 37.6 (H) 27.0 - 33.0 pg    MCHC 35.2 33.7 - 35.3 g/dL    RDW 53.8 (H) 35.9 - 50.0 fL    Platelet Count 105 (L) 164 - 446 K/uL    MPV 11.1 9.0 - 12.9 fL    Neutrophils-Polys 57.90 44.00 - 72.00 %    Lymphocytes 29.20 22.00 - 41.00 %    Monocytes 9.50 0.00 - 13.40 %    Eosinophils 2.10 0.00 - 6.90 %    Basophils 1.00 0.00 - 1.80 %    Immature Granulocytes 0.30 0.00 - 0.90 %    Nucleated RBC 0.00 /100 WBC    Neutrophils (Absolute) 2.26 1.82 - 7.42 K/uL    Lymphs (Absolute) 1.14 1.00 - 4.80 K/uL    Monos (Absolute) 0.37 0.00 - 0.85 K/uL    Eos (Absolute) 0.08 0.00 - 0.51 K/uL    Baso (Absolute) 0.04 0.00 - 0.12 K/uL    Immature Granulocytes (abs) 0.01 0.00 - 0.11 K/uL    NRBC (Absolute) 0.00 K/uL   COMP METABOLIC PANEL   Result Value Ref Range    Sodium 134 (L) 135 - 145 mmol/L    Potassium 3.0 (L) 3.6 - 5.5 mmol/L    Chloride 98 96 - 112 mmol/L    Co2 25 20 - 33 mmol/L    Anion Gap 11.0 0.0 - 11.9    Glucose 76 65 - 99 mg/dL    Bun 9 8 - 22 mg/dL    Creatinine 0.67 0.50 - 1.40 mg/dL    Calcium 8.8 8.5 - 10.5 mg/dL    AST(SGOT) 88 (H) 12 - 45 U/L    ALT(SGPT) 60 (H) 2 - 50 U/L    Alkaline Phosphatase 118 (H) 30 - 99 U/L    Total Bilirubin 4.2 (H) 0.1 - 1.5 mg/dL    Albumin 3.7 3.2 - 4.9 g/dL    Total Protein 5.8 (L) 6.0 - 8.2 g/dL    Globulin 2.1 1.9 - 3.5 g/dL    A-G Ratio 1.8 g/dL   DIAGNOSTIC ALCOHOL   Result Value Ref Range    Diagnostic Alcohol 0.01 (H) 0.00 g/dL   AMMONIA   Result Value Ref Range    Ammonia 49 (H) 11 - 45 umol/L   MAGNESIUM   Result Value Ref Range    Magnesium 1.6 1.5 - 2.5 mg/dL   PHOSPHORUS   Result Value Ref Range    Phosphorus 3.1 2.5 - 4.5 mg/dL   ESTIMATED GFR   Result Value  Ref Range    GFR If African American >60 >60 mL/min/1.73 m 2    GFR If Non African American >60 >60 mL/min/1.73 m 2   EKG (NOW)   Result Value Ref Range    Report       [facility] Emergency Dept[facility]    Test Date:  2020  Pt Name:    VERONICA MONROY              Department: ER  MRN:        [id number]                      Room:       [facility]  Gender:     Male                         Technician: [id number]  :        1980                   Requested By:SHELLI KEN  Order #:    [id number]                    Reading MD: SHELLI KEN MD    Measurements  Intervals                                Axis  Rate:       176                          P:  SC:                                      QRS:        4  QRSD:       106                          T:          23  QT:         368  QTc:        630    Interpretive Statements  SINUS RHYTHM, artifact limits interpretation  BORDERLINE INTRAVENTRICULAR CONDUCTION DELAY  RSR' IN V1 OR V2, PROBABLY NORMAL VARIANT  PROLONGED QT INTERVAL  Compared to ECG 2016 04:45:43  New Qt prolongation  RSR' in V1 or V2 now present  Sinus tachycardia no longer present  Myocardial infarct finding no  longer present  Electronically Signed On 2020 0:19:51 PST by SHELLI KEN MD         All labs reviewed by me.    EKG  12 Lead EKG interpreted by me as shown above.    RADIOLOGY  DX-NECK FOR SOFT TISSUE   Final Result      Study limitations as above. Airway is midline.      CT-SOFT TISSUE NECK WITH    (Results Pending)     The radiologist's interpretation of all radiological studies have been reviewed by me.    COURSE & MEDICAL DECISION MAKING  Pertinent Labs & Imaging studies reviewed. (See chart for details)    9:28 PM - Patient seen and examined at bedside. Patient will be treated with Haloperidol 5 mg, Ativan 0.5 mg every 4 hours, folic acid 1 mg, theragran 1 mg, thiaine 100 mg, and Librium 25 mg every 6 hours. Ordered DX-Neck for soft tissue, EKG,  Ammonia, Diagnostic Alcohol, CMP, Urine Drug Screen, CBC with differential, Phosphorus, and Magnesium to evaluate his symptoms. The differential diagnoses include but are not limited to: drug abuse, alcohol intoxication, alcohol withdrawal, Electrolyte abnormality, hyperammoniemia, methamphetamine abuse, drug induced psychosis.    Patient's electrolytes were replaced and magnesium was given given the patient's QT prolongation.     Discussed the case with Dr. Tapia hospitalist she is agreed to consult on hospitalization    Medical Decision Making: At this point time I think the patient's hallucinations are likely secondary to his methamphetamine abuse but may be compiled by his elevated ammonia level.  Patient does have a pretty significant QT prolongation therefore I think he would be benefited by hospitalization for careful monitoring.      DISPOSITION:  Patient will be hospitalized by Dr. Tapia in guarded condition.    FINAL IMPRESSION  1. Hallucinations    2. Methamphetamine abuse (HCC)    3. Prolonged Q-T interval on ECG    4. Increased ammonia level    5. Hypokalemia          Melissa GOSS (Paulieibe), am scribing for, and in the presence of, Estrada Reddy M.D.    Electronically signed by: Melissa Mathew (Paulieibe), 1/20/2020    Estrada GOSS M.D. personally performed the services described in this documentation, as scribed by Melissa Mathew in my presence, and it is both accurate and complete. C    The note accurately reflects work and decisions made by me.  Estrada Reddy M.D.  1/21/2020  12:28 AM

## 2020-01-21 NOTE — PROCEDURES
ROUTINE ELECTROENCEPHALOGRAM REPORT      Referring provider: Dr. Bautista.    DOS: 1/21/2020 (total recording of 24 minutes)    INDICATION:  Estiven Hills Jr. 39 y.o. male presenting with altered mental status, hallucinations.    CURRENT ANTIEPILEPTIC REGIMEN: Lorazepam.    TECHNIQUE: 30 channel routine electroencephalogram (EEG) was performed in accordance with the international 10-20 system. The study was reviewed in bipolar and referential montages. The recording examined the patient during wakeful and drowsy/sleep state(s).     DESCRIPTION OF THE RECORD:  During the wakefulness, the background showed a symmetrical 12 hz alpha activity posteriorly with amplitude of 70 mV.  There was reactivity to eye closure/opening.  A normal anterior-posterior gradient was noted with faster beta frequencies seen anteriorly.  During drowsiness, theta/delta frequencies were seen.    During the sleep state, background shows diffuse high-amplitude 4-5 Hz delta activity.  Symmetrical high-amplitude sleep spindles and vertex sharps were seen in the leads over the central regions.     ACTIVATION PROCEDURES:   Intermittent Photic stimulation was performed in a stepwise fashion from 1 to 30 Hz and elicited a normal response (photic driving), most noticeable in the posterior leads.      ICTAL AND/OR INTERICTAL FINDINGS:   No focal or generalized epileptiform activity noted. No regional slowing was seen during this routine study.  No seizures were reported or recorded during the study.  The patient was agitated during the study.    EKG: sampling of the EKG recording demonstrated sinus rhythm.       INTERPRETATION:  This is a normal routine EEG recording in the awake, drowsy, and sleep state(s).  The patient was agitated and confused during the study.  No seizures were captured.  Clinical correlation is recommended.    Note: A normal EEG does not rule out epilepsy.  If the clinical suspicion remains high for seizures, a prolonged recording  to capture clinical or subclinical events may be helpful.        Yoandy Gonzales MD   Epilepsy and Neurodiagnostics.   Clinical  of Neurology [facility].   Diplomate in Neurology, Epilepsy, and Electrodiagnostic Medicine.   Office: [phone]  Fax: [phone]

## 2020-01-21 NOTE — PROGRESS NOTES
Received bedside report from RN, pt care assumed, VSS, pt assessment complete. Pt AOx1 audio and visual hallucinations. Restraints started. Ativan Given. New IV placed. Bed in lowest position, bed alarm on, pt educated on fall risk

## 2020-01-21 NOTE — PROGRESS NOTES
Patient arrived to unit via gurney. Ambulated to bed with stand-by assist. Assumed pt care. A/Ox4, VSS. No complaints or pain at this time. Pt is on CIWA protocol, padding on bed rails in place. Pt oriented to unit and call light system. POC reviewed and white board updated. Tele box on. Monitor room notified. Pt recently had a fall. Safety precautions in place. Call light in reach. Bed locked in lowest position with upper bed rails up. Bed alarm on, plugged in correctly, and placed correctly under pt. Pt educated to call before getting out of bed. Verbalized understanding.

## 2020-01-21 NOTE — CARE PLAN
Problem: Safety  Goal: Will remain free from falls  Outcome: PROGRESSING AS EXPECTED  Educate pt to call for assistance as needed. Safety and fall precautions in place: bed is locked and in lowest position, call light within reach, bed alarm is on and placed correctly and pt verbalizes understanding. Pt also on CIWA protocol, padding on bed rails in place.       Problem: Infection  Goal: Will remain free from infection  Outcome: PROGRESSING AS EXPECTED   Standard precautions in place. Hand hygiene performed before and after pt contact.

## 2020-01-21 NOTE — DISCHARGE PLANNING
CM reviewed chart and completed assessment. Pt was IPTA, with history of alcohol and meth use. CM will remain available for substance abuse resources as needed.   Care Transition Team Assessment    Information Source  Orientation : Disoriented to Place, Disoriented to Event  Information Given By: Patient         Elopement Risk  Legal Hold: No  Ambulatory or Self Mobile in Wheelchair: Yes  Disoriented: No  Psychiatric Symptoms: None  History of Wandering: No  Elopement this Admit: No  Vocalizing Wanting to Leave: No  Displays Behaviors, Body Language Wanting to Leave: No-Not at Risk for Elopement  Elopement Risk: Not at Risk for Elopement    Interdisciplinary Discharge Planning  Primary Care Physician: At [facility]  Patient or legal guardian wants to designate a caregiver (see row info): No  Patient Expects to be Discharged to:: Home  Assistance Needed: No  Durable Medical Equipment: Not Applicable                   Vision / Hearing Impairment  Vision Impairment : No  Hearing Impairment : No              Domestic Abuse  Have you ever been the victim of abuse or violence?: No  Physical Abuse or Sexual Abuse: No  Verbal Abuse or Emotional Abuse: No  Possible Abuse Reported to:: Not Applicable    Psychological Assessment  History of Substance Abuse: Alcohol, Methamphetamine         Anticipated Discharge Information  Anticipated discharge disposition: Chemical dependency treatment, Detox / sobering, Home
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
## 2020-01-22 ENCOUNTER — APPOINTMENT (OUTPATIENT)
Dept: RADIOLOGY | Facility: MEDICAL CENTER | Age: 40
DRG: 896 | End: 2020-01-22
Attending: HOSPITALIST
Payer: MEDICARE

## 2020-01-22 PROBLEM — G93.40 ENCEPHALOPATHY: Status: ACTIVE | Noted: 2020-01-21

## 2020-01-22 PROBLEM — K72.01 ACUTE LIVER FAILURE WITH HEPATIC COMA (HCC): Status: ACTIVE | Noted: 2020-01-21

## 2020-01-22 PROBLEM — K70.30 ALCOHOLIC CIRRHOSIS OF LIVER WITHOUT ASCITES (HCC): Status: ACTIVE | Noted: 2020-01-22

## 2020-01-22 LAB
ALBUMIN SERPL BCP-MCNC: 3.4 G/DL (ref 3.2–4.9)
ALBUMIN/GLOB SERPL: 1.8 G/DL
ALP SERPL-CCNC: 118 U/L (ref 30–99)
ALT SERPL-CCNC: 75 U/L (ref 2–50)
AMMONIA PLAS-SCNC: 39 UMOL/L (ref 11–45)
ANION GAP SERPL CALC-SCNC: 8 MMOL/L (ref 0–11.9)
AST SERPL-CCNC: 121 U/L (ref 12–45)
BILIRUB SERPL-MCNC: 2.9 MG/DL (ref 0.1–1.5)
BUN SERPL-MCNC: 5 MG/DL (ref 8–22)
CALCIUM SERPL-MCNC: 8.6 MG/DL (ref 8.5–10.5)
CHLORIDE SERPL-SCNC: 105 MMOL/L (ref 96–112)
CO2 SERPL-SCNC: 25 MMOL/L (ref 20–33)
CREAT SERPL-MCNC: 0.66 MG/DL (ref 0.5–1.4)
ERYTHROCYTE [DISTWIDTH] IN BLOOD BY AUTOMATED COUNT: 55.8 FL (ref 35.9–50)
GLOBULIN SER CALC-MCNC: 1.9 G/DL (ref 1.9–3.5)
GLUCOSE SERPL-MCNC: 86 MG/DL (ref 65–99)
HCT VFR BLD AUTO: 38.6 % (ref 42–52)
HGB BLD-MCNC: 13.2 G/DL (ref 14–18)
MCH RBC QN AUTO: 37.5 PG (ref 27–33)
MCHC RBC AUTO-ENTMCNC: 34.2 G/DL (ref 33.7–35.3)
MCV RBC AUTO: 109.7 FL (ref 81.4–97.8)
PLATELET # BLD AUTO: 110 K/UL (ref 164–446)
PMV BLD AUTO: 11 FL (ref 9–12.9)
POTASSIUM SERPL-SCNC: 3.6 MMOL/L (ref 3.6–5.5)
PROT SERPL-MCNC: 5.3 G/DL (ref 6–8.2)
RBC # BLD AUTO: 3.52 M/UL (ref 4.7–6.1)
SODIUM SERPL-SCNC: 138 MMOL/L (ref 135–145)
WBC # BLD AUTO: 3.7 K/UL (ref 4.8–10.8)

## 2020-01-22 PROCEDURE — 76705 ECHO EXAM OF ABDOMEN: CPT

## 2020-01-22 PROCEDURE — 82140 ASSAY OF AMMONIA: CPT

## 2020-01-22 PROCEDURE — A9270 NON-COVERED ITEM OR SERVICE: HCPCS | Performed by: HOSPITALIST

## 2020-01-22 PROCEDURE — 700102 HCHG RX REV CODE 250 W/ 637 OVERRIDE(OP): Performed by: HOSPITALIST

## 2020-01-22 PROCEDURE — 85027 COMPLETE CBC AUTOMATED: CPT

## 2020-01-22 PROCEDURE — 99233 SBSQ HOSP IP/OBS HIGH 50: CPT | Performed by: HOSPITALIST

## 2020-01-22 PROCEDURE — 770020 HCHG ROOM/CARE - TELE (206)

## 2020-01-22 PROCEDURE — 80053 COMPREHEN METABOLIC PANEL: CPT

## 2020-01-22 PROCEDURE — 36415 COLL VENOUS BLD VENIPUNCTURE: CPT

## 2020-01-22 RX ORDER — FUROSEMIDE 20 MG/1
20 TABLET ORAL
Status: DISCONTINUED | OUTPATIENT
Start: 2020-01-22 | End: 2020-01-23 | Stop reason: HOSPADM

## 2020-01-22 RX ORDER — SPIRONOLACTONE 25 MG/1
25 TABLET ORAL
Status: DISCONTINUED | OUTPATIENT
Start: 2020-01-22 | End: 2020-01-23 | Stop reason: HOSPADM

## 2020-01-22 RX ADMIN — THERA TABS 1 TABLET: TAB at 05:02

## 2020-01-22 RX ADMIN — FOLIC ACID 1 MG: 1 TABLET ORAL at 05:02

## 2020-01-22 RX ADMIN — SENNOSIDES AND DOCUSATE SODIUM 2 TABLET: 8.6; 5 TABLET ORAL at 05:02

## 2020-01-22 RX ADMIN — SPIRONOLACTONE 25 MG: 25 TABLET ORAL at 14:36

## 2020-01-22 RX ADMIN — FUROSEMIDE 20 MG: 20 TABLET ORAL at 14:36

## 2020-01-22 RX ADMIN — LACTULOSE 15 ML: 20 SOLUTION ORAL at 05:03

## 2020-01-22 RX ADMIN — LORAZEPAM 1 MG: 1 TABLET ORAL at 05:02

## 2020-01-22 RX ADMIN — LORAZEPAM 2 MG: 2 TABLET ORAL at 21:50

## 2020-01-22 RX ADMIN — Medication 100 MG: at 05:02

## 2020-01-22 ASSESSMENT — ENCOUNTER SYMPTOMS
FEVER: 0
CLAUDICATION: 0
WEAKNESS: 0
EYE PAIN: 0
BACK PAIN: 0
HALLUCINATIONS: 1
SHORTNESS OF BREATH: 0
PND: 0
SPUTUM PRODUCTION: 0
SORE THROAT: 0
SINUS PAIN: 0
TREMORS: 0
NECK PAIN: 0
DIZZINESS: 0
WHEEZING: 0
BRUISES/BLEEDS EASILY: 0
ABDOMINAL PAIN: 0
BLURRED VISION: 0
POLYDIPSIA: 0
DIARRHEA: 0
DOUBLE VISION: 0
HEADACHES: 0
STRIDOR: 0
CHILLS: 0
PHOTOPHOBIA: 0
BLOOD IN STOOL: 0
FALLS: 0
HEMOPTYSIS: 0
PALPITATIONS: 1
HEARTBURN: 0
TINGLING: 0
CONSTIPATION: 0
DIAPHORESIS: 0
FOCAL WEAKNESS: 0
MYALGIAS: 0
NAUSEA: 1
COUGH: 0
VOMITING: 0
ORTHOPNEA: 0
DEPRESSION: 0
FLANK PAIN: 0

## 2020-01-22 ASSESSMENT — LIFESTYLE VARIABLES
HEADACHE, FULLNESS IN HEAD: NOT PRESENT
VISUAL DISTURBANCES: NOT PRESENT
VISUAL DISTURBANCES: NOT PRESENT
TREMOR: TREMOR NOT VISIBLE BUT CAN BE FELT, FINGERTIP TO FINGERTIP
ANXIETY: MODERATELY ANXIOUS OR GUARDED, SO ANXIETY IS INFERRED
PAROXYSMAL SWEATS: NO SWEAT VISIBLE
AUDITORY DISTURBANCES: NOT PRESENT
NAUSEA AND VOMITING: NO NAUSEA AND NO VOMITING
TREMOR: *
PAROXYSMAL SWEATS: NO SWEAT VISIBLE
TREMOR: TREMOR NOT VISIBLE BUT CAN BE FELT, FINGERTIP TO FINGERTIP
ORIENTATION AND CLOUDING OF SENSORIUM: ORIENTED AND CAN DO SERIAL ADDITIONS
ANXIETY: MILDLY ANXIOUS
AGITATION: SOMEWHAT MORE THAN NORMAL ACTIVITY
TOTAL SCORE: 4
TOTAL SCORE: VERY MILD ITCHING, PINS AND NEEDLES SENSATION, BURNING OR NUMBNESS
HEADACHE, FULLNESS IN HEAD: NOT PRESENT
AGITATION: NORMAL ACTIVITY
HEADACHE, FULLNESS IN HEAD: NOT PRESENT
TOTAL SCORE: 10
PAROXYSMAL SWEATS: BARELY PERCEPTIBLE SWEATING. PALMS MOIST
TOTAL SCORE: 12
AGITATION: SOMEWHAT MORE THAN NORMAL ACTIVITY
HEADACHE, FULLNESS IN HEAD: NOT PRESENT
PAROXYSMAL SWEATS: NO SWEAT VISIBLE
AUDITORY DISTURBANCES: NOT PRESENT
NAUSEA AND VOMITING: NO NAUSEA AND NO VOMITING
AUDITORY DISTURBANCES: NOT PRESENT
VISUAL DISTURBANCES: NOT PRESENT
PAROXYSMAL SWEATS: BARELY PERCEPTIBLE SWEATING. PALMS MOIST
ORIENTATION AND CLOUDING OF SENSORIUM: ORIENTED AND CAN DO SERIAL ADDITIONS
NAUSEA AND VOMITING: NO NAUSEA AND NO VOMITING
ORIENTATION AND CLOUDING OF SENSORIUM: ORIENTED AND CAN DO SERIAL ADDITIONS
ANXIETY: *
NAUSEA AND VOMITING: *
VISUAL DISTURBANCES: NOT PRESENT
TREMOR: MODERATE TREMOR WITH ARMS EXTENDED
ORIENTATION AND CLOUDING OF SENSORIUM: ORIENTED AND CAN DO SERIAL ADDITIONS
AUDITORY DISTURBANCES: NOT PRESENT
SUBSTANCE_ABUSE: 0
NAUSEA AND VOMITING: NO NAUSEA AND NO VOMITING
TOTAL SCORE: 2
TREMOR: TREMOR NOT VISIBLE BUT CAN BE FELT, FINGERTIP TO FINGERTIP
HEADACHE, FULLNESS IN HEAD: NOT PRESENT
AGITATION: *
AGITATION: MODERATELY FIDGETY AND RESTLESS
AUDITORY DISTURBANCES: VERY MILD HARSHNESS OR ABILITY TO FRIGHTEN
TOTAL SCORE: 5
VISUAL DISTURBANCES: NOT PRESENT
ANXIETY: MILDLY ANXIOUS
ANXIETY: MILDLY ANXIOUS
ORIENTATION AND CLOUDING OF SENSORIUM: ORIENTED AND CAN DO SERIAL ADDITIONS

## 2020-01-22 NOTE — CARE PLAN
Problem: Safety  Goal: Will remain free from injury  Outcome: PROGRESSING AS EXPECTED  Goal: Will remain free from falls  Outcome: PROGRESSING AS EXPECTED     Problem: Communication  Goal: The ability to communicate needs accurately and effectively will improve  Outcome: PROGRESSING AS EXPECTED     Problem: Infection  Goal: Will remain free from infection  Outcome: PROGRESSING AS EXPECTED     Problem: Venous Thromboembolism (VTW)/Deep Vein Thrombosis (DVT) Prevention:  Goal: Patient will participate in Venous Thrombosis (VTE)/Deep Vein Thrombosis (DVT)Prevention Measures  Outcome: PROGRESSING AS EXPECTED     Problem: Bowel/Gastric:  Goal: Normal bowel function is maintained or improved  Outcome: PROGRESSING AS EXPECTED

## 2020-01-22 NOTE — ASSESSMENT & PLAN NOTE
Check INR  Patient has thrombocytopenia getting poor synthetic function of liver  I will start him on Lasix and spironolactone since he has portal hypertension  I will continue to trend ammonia levels  Patient will need GI follow-up as an outpatient
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
## 2020-01-23 ENCOUNTER — PATIENT OUTREACH (OUTPATIENT)
Dept: HEALTH INFORMATION MANAGEMENT | Facility: OTHER | Age: 40
End: 2020-01-23

## 2020-01-23 VITALS
HEIGHT: 71 IN | BODY MASS INDEX: 35.43 KG/M2 | OXYGEN SATURATION: 98 % | SYSTOLIC BLOOD PRESSURE: 111 MMHG | DIASTOLIC BLOOD PRESSURE: 68 MMHG | RESPIRATION RATE: 18 BRPM | HEART RATE: 105 BPM | WEIGHT: 253.09 LBS | TEMPERATURE: 96.8 F

## 2020-01-23 LAB
ALBUMIN SERPL BCP-MCNC: 3.1 G/DL (ref 3.2–4.9)
ALBUMIN/GLOB SERPL: 1.5 G/DL
ALP SERPL-CCNC: 105 U/L (ref 30–99)
ALT SERPL-CCNC: 72 U/L (ref 2–50)
ANION GAP SERPL CALC-SCNC: 7 MMOL/L (ref 0–11.9)
AST SERPL-CCNC: 106 U/L (ref 12–45)
BASOPHILS # BLD AUTO: 1 % (ref 0–1.8)
BASOPHILS # BLD: 0.03 K/UL (ref 0–0.12)
BILIRUB SERPL-MCNC: 1.8 MG/DL (ref 0.1–1.5)
BUN SERPL-MCNC: 6 MG/DL (ref 8–22)
CALCIUM SERPL-MCNC: 8.2 MG/DL (ref 8.5–10.5)
CHLORIDE SERPL-SCNC: 107 MMOL/L (ref 96–112)
CO2 SERPL-SCNC: 24 MMOL/L (ref 20–33)
CREAT SERPL-MCNC: 0.56 MG/DL (ref 0.5–1.4)
EOSINOPHIL # BLD AUTO: 0.07 K/UL (ref 0–0.51)
EOSINOPHIL NFR BLD: 2.4 % (ref 0–6.9)
ERYTHROCYTE [DISTWIDTH] IN BLOOD BY AUTOMATED COUNT: 57.3 FL (ref 35.9–50)
GLOBULIN SER CALC-MCNC: 2.1 G/DL (ref 1.9–3.5)
GLUCOSE SERPL-MCNC: 94 MG/DL (ref 65–99)
HCT VFR BLD AUTO: 35.3 % (ref 42–52)
HGB BLD-MCNC: 11.6 G/DL (ref 14–18)
IMM GRANULOCYTES # BLD AUTO: 0.02 K/UL (ref 0–0.11)
IMM GRANULOCYTES NFR BLD AUTO: 0.7 % (ref 0–0.9)
INR PPP: 1.04 (ref 0.87–1.13)
LYMPHOCYTES # BLD AUTO: 1.17 K/UL (ref 1–4.8)
LYMPHOCYTES NFR BLD: 40.3 % (ref 22–41)
MAGNESIUM SERPL-MCNC: 1.9 MG/DL (ref 1.5–2.5)
MCH RBC QN AUTO: 37.1 PG (ref 27–33)
MCHC RBC AUTO-ENTMCNC: 32.9 G/DL (ref 33.7–35.3)
MCV RBC AUTO: 112.8 FL (ref 81.4–97.8)
MONOCYTES # BLD AUTO: 0.4 K/UL (ref 0–0.85)
MONOCYTES NFR BLD AUTO: 13.8 % (ref 0–13.4)
NEUTROPHILS # BLD AUTO: 1.21 K/UL (ref 1.82–7.42)
NEUTROPHILS NFR BLD: 41.8 % (ref 44–72)
NRBC # BLD AUTO: 0 K/UL
NRBC BLD-RTO: 0 /100 WBC
PHOSPHATE SERPL-MCNC: 2.6 MG/DL (ref 2.5–4.5)
PLATELET # BLD AUTO: 119 K/UL (ref 164–446)
PMV BLD AUTO: 10.7 FL (ref 9–12.9)
POTASSIUM SERPL-SCNC: 3.4 MMOL/L (ref 3.6–5.5)
PROT SERPL-MCNC: 5.2 G/DL (ref 6–8.2)
PROTHROMBIN TIME: 13.8 SEC (ref 12–14.6)
RBC # BLD AUTO: 3.13 M/UL (ref 4.7–6.1)
SODIUM SERPL-SCNC: 138 MMOL/L (ref 135–145)
WBC # BLD AUTO: 2.9 K/UL (ref 4.8–10.8)

## 2020-01-23 PROCEDURE — 85610 PROTHROMBIN TIME: CPT

## 2020-01-23 PROCEDURE — 36415 COLL VENOUS BLD VENIPUNCTURE: CPT

## 2020-01-23 PROCEDURE — 80053 COMPREHEN METABOLIC PANEL: CPT

## 2020-01-23 PROCEDURE — 700102 HCHG RX REV CODE 250 W/ 637 OVERRIDE(OP): Performed by: HOSPITALIST

## 2020-01-23 PROCEDURE — A9270 NON-COVERED ITEM OR SERVICE: HCPCS | Performed by: HOSPITALIST

## 2020-01-23 PROCEDURE — 83735 ASSAY OF MAGNESIUM: CPT

## 2020-01-23 PROCEDURE — 84100 ASSAY OF PHOSPHORUS: CPT

## 2020-01-23 PROCEDURE — 85025 COMPLETE CBC W/AUTO DIFF WBC: CPT

## 2020-01-23 PROCEDURE — 99239 HOSP IP/OBS DSCHRG MGMT >30: CPT | Performed by: HOSPITALIST

## 2020-01-23 RX ORDER — POTASSIUM CHLORIDE 20 MEQ/1
20 TABLET, EXTENDED RELEASE ORAL DAILY
Qty: 30 TAB | Refills: 3 | Status: SHIPPED | OUTPATIENT
Start: 2020-01-23 | End: 2022-04-19

## 2020-01-23 RX ORDER — POTASSIUM CHLORIDE 20 MEQ/1
40 TABLET, EXTENDED RELEASE ORAL ONCE
Status: COMPLETED | OUTPATIENT
Start: 2020-01-23 | End: 2020-01-23

## 2020-01-23 RX ORDER — SPIRONOLACTONE 25 MG/1
25 TABLET ORAL DAILY
Qty: 30 TAB | Refills: 3 | Status: SHIPPED | OUTPATIENT
Start: 2020-01-24 | End: 2020-01-23 | Stop reason: SDUPTHER

## 2020-01-23 RX ORDER — SPIRONOLACTONE 25 MG/1
25 TABLET ORAL DAILY
Qty: 30 TAB | Refills: 3 | Status: SHIPPED | OUTPATIENT
Start: 2020-01-24 | End: 2022-04-19

## 2020-01-23 RX ORDER — FUROSEMIDE 20 MG/1
20 TABLET ORAL DAILY
Qty: 60 TAB | Refills: 3 | Status: SHIPPED | OUTPATIENT
Start: 2020-01-24 | End: 2020-01-23 | Stop reason: SDUPTHER

## 2020-01-23 RX ORDER — POTASSIUM CHLORIDE 20 MEQ/1
20 TABLET, EXTENDED RELEASE ORAL DAILY
Qty: 30 TAB | Refills: 3 | Status: SHIPPED | OUTPATIENT
Start: 2020-01-23 | End: 2020-01-23 | Stop reason: SDUPTHER

## 2020-01-23 RX ORDER — FUROSEMIDE 20 MG/1
20 TABLET ORAL DAILY
Qty: 60 TAB | Refills: 3 | Status: SHIPPED | OUTPATIENT
Start: 2020-01-24 | End: 2022-04-19

## 2020-01-23 RX ORDER — FAMOTIDINE 40 MG/1
40 TABLET, FILM COATED ORAL DAILY
Qty: 60 TAB | Refills: 3 | Status: SHIPPED | OUTPATIENT
Start: 2020-01-23 | End: 2022-04-19

## 2020-01-23 RX ADMIN — FOLIC ACID 1 MG: 1 TABLET ORAL at 05:15

## 2020-01-23 RX ADMIN — POTASSIUM CHLORIDE 40 MEQ: 1500 TABLET, EXTENDED RELEASE ORAL at 10:01

## 2020-01-23 RX ADMIN — THERA TABS 1 TABLET: TAB at 05:15

## 2020-01-23 RX ADMIN — FUROSEMIDE 20 MG: 20 TABLET ORAL at 05:14

## 2020-01-23 RX ADMIN — SPIRONOLACTONE 25 MG: 25 TABLET ORAL at 05:15

## 2020-01-23 RX ADMIN — Medication 100 MG: at 05:15

## 2020-01-23 RX ADMIN — SENNOSIDES AND DOCUSATE SODIUM 2 TABLET: 8.6; 5 TABLET ORAL at 05:15

## 2020-01-23 NOTE — PROGRESS NOTES
Assumed care of PT A&O 4. Pt resting in bed with no signs of labored breathing. On RA. Tele monitor in place, cardiac rhythm being monitored. Call light within reach, bed in lowest position, upper bed rails up, bed alarm on. Pt was updated on plan of care for the day . Will continue to monitor.

## 2020-01-23 NOTE — PROGRESS NOTES
Discharge instructions give to patient at bedside. Pt verbalizes understanding and states plans for follow up. New and home medications reviewed, post discharge activity level and worsening of symptoms needing follow up care discussed. Telemetry monitor and IV cathlon removed. All belongings accounted for, all questions answered at this time. Pt walked was walked down to his vehicle. prescriptions given to patient.
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
## 2020-01-23 NOTE — DISCHARGE INSTRUCTIONS
Discharge Instructions    Discharged to home by car with self. Discharged via walking, hospital escort: Refused.  Special equipment needed: Not Applicable    Be sure to schedule a follow-up appointment with your primary care doctor or any specialists as instructed.     Discharge Plan:   Diet Plan: Discussed  Activity Level: Discussed  Confirmed Follow up Appointment: Appointment Scheduled  Confirmed Symptoms Management: Discussed  Medication Reconciliation Updated: Yes  Influenza Vaccine Indication: Patient Refuses    I understand that a diet low in cholesterol, fat, and sodium is recommended for good health. Unless I have been given specific instructions below for another diet, I accept this instruction as my diet prescription.   Other diet:     Special Instructions: None    · Is patient discharged on Warfarin / Coumadin?   No       Alcohol Use Disorder  Alcohol use disorder is when your drinking disrupts your daily life. When you have this condition, you drink too much alcohol and you cannot control your drinking.  Alcohol use disorder can cause serious problems with your physical health. It can affect your brain, heart, liver, pancreas, immune system, stomach, and intestines. Alcohol use disorder can increase your risk for certain cancers and cause problems with your mental health, such as depression, anxiety, psychosis, delirium, and dementia. People with this disorder risk hurting themselves and others.  What are the causes?  This condition is caused by drinking too much alcohol over time. It is not caused by drinking too much alcohol only one or two times. Some people with this condition drink alcohol to cope with or escape from negative life events. Others drink to relieve pain or symptoms of mental illness.  What increases the risk?  You are more likely to develop this condition if:  · You have a family history of alcohol use disorder.  · Your culture encourages drinking to the point of intoxication, or  makes alcohol easy to get.  · You had a mood or conduct disorder in childhood.  · You have been a victim of abuse.  · You are an adolescent and:  ¨ You have poor grades or difficulties in school.  ¨ Your caregivers do not talk to you about saying no to alcohol, or supervise your activities.  ¨ You are impulsive or you have trouble with self-control.  What are the signs or symptoms?  Symptoms of this condition include:  · Drinking more than you want to.  · Drinking for longer than you want to.  · Trying several times to drink less or to control your drinking.  · Spending a lot of time getting alcohol, drinking, or recovering from drinking.  · Craving alcohol.  · Having problems at work, at school, or at home due to drinking.  · Having problems in relationships due to drinking.  · Drinking when it is dangerous to drink, such as before driving a car.  · Continuing to drink even though you know you might have a physical or mental problem related to drinking.  · Needing more and more alcohol to get the same effect you want from the alcohol (building up tolerance).  · Having symptoms of withdrawal when you stop drinking. Symptoms of withdrawal include:  ¨ Fatigue.  ¨ Nightmares.  ¨ Trouble sleeping.  ¨ Depression.  ¨ Anxiety.  ¨ Fever.  ¨ Seizures.  ¨ Severe confusion.  ¨ Feeling or seeing things that are not there (hallucinations).  ¨ Tremors.  ¨ Rapid heart rate.  ¨ Rapid breathing.  ¨ High blood pressure.  · Drinking to avoid symptoms of withdrawal.  How is this diagnosed?  This condition is diagnosed with an assessment. Your health care provider may start the assessment by asking three or four questions about your drinking.  Your health care provider may perform a physical exam or do lab tests to see if you have physical problems resulting from alcohol use. She or he may refer you to a mental health professional for evaluation.  How is this treated?  Some people with alcohol use disorder are able to reduce their  alcohol use to low-risk levels. Others need to completely quit drinking alcohol. When necessary, mental health professionals with specialized training in substance use treatment can help. Your health care provider can help you decide how severe your alcohol use disorder is and what type of treatment you need. The following forms of treatment are available:  · Detoxification. Detoxification involves quitting drinking and using prescription medicines within the first week to help lessen withdrawal symptoms. This treatment is important for people who have had withdrawal symptoms before and for heavy drinkers who are likely to have withdrawal symptoms. Alcohol withdrawal can be dangerous, and in severe cases, it can cause death. Detoxification may be provided in a home, community, or primary care setting, or in a hospital or substance use treatment facility.  · Counseling. This treatment is also called talk therapy. It is provided by substance use treatment counselors. A counselor can address the reasons you use alcohol and suggest ways to keep you from drinking again or to prevent problem drinking. The goals of talk therapy are to:  ¨ Find healthy activities and ways for you to cope with stress.  ¨ Identify and avoid the things that trigger your alcohol use.  ¨ Help you learn how to handle cravings.  · Medicines. Medicines can help treat alcohol use disorder by:  ¨ Decreasing alcohol cravings.  ¨ Decreasing the positive feeling you have when you drink alcohol.  ¨ Causing an uncomfortable physical reaction when you drink alcohol (aversion therapy).  · Support groups. Support groups are led by people who have quit drinking. They provide emotional support, advice, and guidance.  These forms of treatment are often combined. Some people with this condition benefit from a combination of treatments provided by specialized substance use treatment centers.  Follow these instructions at home:  · Take over-the-counter and  prescription medicines only as told by your health care provider.  · Check with your health care provider before starting any new medicines.  · Ask friends and family members not to offer you alcohol.  · Avoid situations where alcohol is served, including gatherings where others are drinking alcohol.  · Create a plan for what to do when you are tempted to use alcohol.  · Find hobbies or activities that you enjoy that do not include alcohol.  · Keep all follow-up visits as told by your health care provider. This is important.  How is this prevented?  · If you drink, limit alcohol intake to no more than 1 drink a day for nonpregnant women and 2 drinks a day for men. One drink equals 12 oz of beer, 5 oz of wine, or 1½ oz of hard liquor.  · If you have a mental health condition, get treatment and support.  · Do not give alcohol to adolescents.  · If you are an adolescent:  ¨ Do not drink alcohol.  ¨ Do not be afraid to say no if someone offers you alcohol. Speak up about why you do not want to drink. You can be a positive role model for your friends and set a good example for those around you by not drinking alcohol.  ¨ If your friends drink, spend time with others who do not drink alcohol. Make new friends who do not use alcohol.  ¨ Find healthy ways to manage stress and emotions, such as meditation or deep breathing, exercise, spending time in nature, listening to music, or talking with a trusted friend or family member.  Contact a health care provider if:  · You are not able to take your medicines as told.  · Your symptoms get worse.  · You return to drinking alcohol (relapse) and your symptoms get worse.  Get help right away if:  · You have thoughts about hurting yourself or others.  If you ever feel like you may hurt yourself or others, or have thoughts about taking your own life, get help right away. You can go to your nearest emergency department or call:  · Your local emergency services (911 in the [place]  · A  suicide crisis helpline, such as the National Suicide Prevention Lifeline at 1-887.320.4952. This is open 24 hours a day.  Summary  · Alcohol use disorder is when your drinking disrupts your daily life. When you have this condition, you drink too much alcohol and you cannot control your drinking.  · Treatment may include detoxification, counseling, medicine, and support groups.  · Ask friends and family members not to offer you alcohol. Avoid situations where alcohol is served.  · Get help right away if you have thoughts about hurting yourself or others.  This information is not intended to replace advice given to you by your health care provider. Make sure you discuss any questions you have with your health care provider.  Document Released: 01/25/2006 Document Revised: 09/14/2017 Document Reviewed: 09/14/2017  [employer] Interactive Patient Education © 2017 [employer]              Depression / Suicide Risk    As you are discharged from this [facility] facility, it is important to learn how to keep safe from harming yourself.    Recognize the warning signs:  · Abrupt changes in personality, positive or negative- including increase in energy   · Giving away possessions  · Change in eating patterns- significant weight changes-  positive or negative  · Change in sleeping patterns- unable to sleep or sleeping all the time   · Unwillingness or inability to communicate  · Depression  · Unusual sadness, discouragement and loneliness  · Talk of wanting to die  · Neglect of personal appearance   · Rebelliousness- reckless behavior  · Withdrawal from people/activities they love  · Confusion- inability to concentrate     If you or a loved one observes any of these behaviors or has concerns about self-harm, here's what you can do:  · Talk about it- your feelings and reasons for harming yourself  · Remove any means that you might use to hurt yourself (examples: pills, rope, extension cords, firearm)  · Get professional help  from the community (Mental Health, Substance Abuse, psychological counseling)  · Do not be alone:Call your Safe Contact- someone whom you trust who will be there for you.  · Call your local CRISIS HOTLINE 367-9985 or 885-049-2415  · Call your local Children's Mobile Crisis Response Team [place] (400) 786-4024 or www.[employer]  · Call the toll free National Suicide Prevention Hotlines   · National Suicide Prevention Lifeline 652-209-BVVE (7040)  · National Hope Line Network 800-SUICIDE (492-6933)

## 2020-01-23 NOTE — CARE PLAN
Problem: Bowel/Gastric:  Goal: Normal bowel function is maintained or improved  Outcome: PROGRESSING AS EXPECTED     Problem: Knowledge Deficit  Goal: Knowledge of disease process/condition, treatment plan, diagnostic tests, and medications will improve  Outcome: PROGRESSING SLOWER THAN EXPECTED  Note:   Patient is still forgetful, asks same questions about disease process throughout day

## 2020-01-24 NOTE — DOCUMENTATION QUERY
[facility]                                                                       Query Response Note      PATIENT:               VERONICA MONROY  ACCT #:                  [id number]  MRN:                     [id number]  :                      1980  ADMIT DATE:       2020 8:51 PM  DISCH DATE:        2020 11:16 AM  RESPONDING  PROVIDER #:        [id number]           QUERY TEXT:    Methamphetamine withdrawal is documented in the Medical Record. Per coding guidelines, methamphetamine withdrawal defaults to methamphetamine dependence. When only abuse and withdrawal are documented, [facility] instructs coders to query the physician for clarification. Please clarify this patient's pattern of the methamphetamine use    NOTE:  If an appropriate response is not listed below, please respond with a new note.    The patient's Clinical Indicators include:  Per H&P  -auditory and visual hallucinations   -used meth x3 days this weekend for the first time   -continues to have tremors, nausea, and overall feeling poorly    Per Progress Notes  -Patient states that he stopped drinking alcohol 5 days ago & used methamphetamine for the first time  -continues to complain about hallucinations and is actively withdrawing from methamphetamine  -Likely a psychotic episode from methamphetamine use-patient does not describe this that is bugs crawling which is indicative of alcohol withdrawal    Treatment:   IV Ativan PRN per CIWA protocol, IVF Rally bag, & po thiamine, MVI, & folate     Risk Factors:   Hx of admission for alcohol withdrawal, three day hx of methamphetamine use, auditory & visual hallucinations, tremor, nausea, & vomiting  Options provided:   -- Methamphetamine Abuse   -- Methamphetamine Dependence   -- Methamphetamine Use only   -- Unable to determine      Query created by: Sonia De La Rosa on 2020 10:43 AM    RESPONSE  TEXT:    Methamphetamine Use only          Electronically signed by:  NEFTALI GARAY MD 1/23/2020 11:41 PM

## 2021-02-07 NOTE — ED NOTES
.Pt discharged in stable condition, ambulatory to waiting room in stable condition with, all belongings with pt, given written and verbal dc instructions no questions voiced     Left arm;

## 2021-09-06 ENCOUNTER — OFFICE VISIT (OUTPATIENT)
Dept: URGENT CARE | Facility: CLINIC | Age: 41
End: 2021-09-06
Payer: MEDICARE

## 2021-09-06 VITALS
HEART RATE: 87 BPM | OXYGEN SATURATION: 97 % | TEMPERATURE: 98.1 F | HEIGHT: 71 IN | RESPIRATION RATE: 14 BRPM | WEIGHT: 270 LBS | BODY MASS INDEX: 37.8 KG/M2 | SYSTOLIC BLOOD PRESSURE: 110 MMHG | DIASTOLIC BLOOD PRESSURE: 74 MMHG

## 2021-09-06 DIAGNOSIS — M10.9 ACUTE GOUT OF LEFT KNEE, UNSPECIFIED CAUSE: ICD-10-CM

## 2021-09-06 DIAGNOSIS — M10.9 ACUTE GOUT OF LEFT FOOT, UNSPECIFIED CAUSE: ICD-10-CM

## 2021-09-06 PROCEDURE — 99203 OFFICE O/P NEW LOW 30 MIN: CPT | Performed by: PHYSICIAN ASSISTANT

## 2021-09-06 RX ORDER — PREDNISONE 20 MG/1
40 TABLET ORAL DAILY
Qty: 10 TABLET | Refills: 0 | Status: SHIPPED | OUTPATIENT
Start: 2021-09-06 | End: 2021-09-06

## 2021-09-06 RX ORDER — PREDNISONE 20 MG/1
40 TABLET ORAL DAILY
Qty: 10 TABLET | Refills: 0 | Status: SHIPPED | OUTPATIENT
Start: 2021-09-06 | End: 2021-09-11

## 2021-09-06 RX ORDER — KETOROLAC TROMETHAMINE 30 MG/ML
30 INJECTION, SOLUTION INTRAMUSCULAR; INTRAVENOUS ONCE
Status: COMPLETED | OUTPATIENT
Start: 2021-09-06 | End: 2021-09-06

## 2021-09-06 RX ADMIN — KETOROLAC TROMETHAMINE 30 MG: 30 INJECTION, SOLUTION INTRAMUSCULAR; INTRAVENOUS at 17:38

## 2021-09-06 ASSESSMENT — FIBROSIS 4 INDEX: FIB4 SCORE: 4.09

## 2021-09-06 NOTE — PROGRESS NOTES
"Subjective:   Estiven Hills Jr. is a 41 y.o. male who presents for Gout (x 3 days on L foot and L knee.  Hx of Gout.)      HPI  Patient is a 41-year-old male who presents the clinic with complaints of a gout flareup.  He reports a history of gout since he was in his 20s.  He states this feels similar and he is confident this is his gout.  Pain is located to his left medial foot and ankle as well as his left knee.  There is mild redness and swelling to his left foot.  Pain is worse with ambulation and movement.  Denies any injury to that area.  No lacerations or abrasions.  Denies any fevers or chills.      Medications:    • famotidine Tabs  • furosemide Tabs  • potassium chloride SA Tbcr  • spironolactone Tabs    Allergies: Codeine and Vicodin [hydrocodone-acetaminophen]    Problem List: Estiven Hills Jr. does not have any pertinent problems on file.    Surgical History:  Past Surgical History:   Procedure Laterality Date   • IRRIGATION & DEBRIDEMENT GENERAL Right 5/27/2016    Procedure: IRRIGATION & DEBRIDEMENT - Abdominal wall abscess ;  Surgeon: Suzanne Amato M.D.;  Location: SURGERY [facility];  Service:    • EXPLORATORY LAPAROTOMY N/A 5/11/2016    Procedure: EXPLORATORY LAPAROTOMY , SMALL BOWEL RESECTION, RIGHT COLECTOMY;  Surgeon: Carlito Barber M.D.;  Location: SURGERY [facility];  Service:    • OTHER CARDIAC SURGERY         Past Social Hx: Estiven Hills Jr.  reports that he has never smoked. He has never used smokeless tobacco. He reports current alcohol use. He reports that he does not use drugs.     Past Family Hx:  Estiven Hills Jr. family history includes Alcohol/Drug in his brother and mother.     Problem list, medications, and allergies reviewed by myself today in Epic.     Objective:     /74   Pulse 87   Temp 36.7 °C (98.1 °F) (Temporal)   Resp 14   Ht 1.803 m (5' 11\")   Wt 122 kg (270 lb)   SpO2 97%   BMI 37.66 kg/m²     Physical Exam  Vitals reviewed. "   Constitutional:       General: He is not in acute distress.     Appearance: Normal appearance. He is not ill-appearing or toxic-appearing.   Eyes:      Conjunctiva/sclera: Conjunctivae normal.      Pupils: Pupils are equal, round, and reactive to light.   Cardiovascular:      Rate and Rhythm: Normal rate.   Pulmonary:      Effort: Pulmonary effort is normal.   Musculoskeletal:      Cervical back: Neck supple.        Feet:       Comments: Left foot:  Full range of motion  Tenderness to palpation to first MTP joint, medial foot, and medial ankle.  There is minimal swelling and minimal erythema.  There is warmth.  No signs of laceration or abrasion that would indicate cellulitis.  Neurovascular intact    Left knee: Full range of motion  Mild tenderness to palpation to lateral knee  Negative erythema, edema, crepitus or deformity.  Neurovascular intact distally  No distal leg swelling   Skin:     General: Skin is warm and dry.   Neurological:      General: No focal deficit present.      Mental Status: He is alert and oriented to person, place, and time.   Psychiatric:         Mood and Affect: Mood normal.         Diagnosis and associated orders:     1. Acute gout of left foot, unspecified cause  ketorolac (TORADOL) injection 30 mg    predniSONE (DELTASONE) 20 MG Tab    DISCONTINUED: predniSONE (DELTASONE) 20 MG Tab   2. Acute gout of left knee, unspecified cause  ketorolac (TORADOL) injection 30 mg    predniSONE (DELTASONE) 20 MG Tab    DISCONTINUED: predniSONE (DELTASONE) 20 MG Tab      Comments/MDM:     • Signs symptoms appear to be consistent with gout flareup  • Patient requesting pain medication.  He will receive a Toradol injection 30 mg IM x1 here in the clinic.  • Start prednisone in 6 to 8 hours.  He works graveyard shift.  Discussed side effects of medications.   • Rest, elevation, ice application.  • Avoid purine rich foods.       I personally reviewed prior external notes and test results pertinent to  today's visit.  Supportive care, natural history, differential diagnoses, and indications for immediate follow-up discussed. Patient expresses understanding and agrees to plan. Patient denies any other questions or concerns.     Advised the patient to follow-up with the primary care physician for recheck, reevaluation, and consideration of further management.    Please note that this dictation was created using voice recognition software. I have made a reasonable attempt to correct obvious errors, but I expect that there are errors of grammar and possibly content that I did not discover before finalizing the note.    This note was electronically signed by Jaylon Plummer PA-C

## 2022-04-12 ENCOUNTER — APPOINTMENT (OUTPATIENT)
Dept: RADIOLOGY | Facility: MEDICAL CENTER | Age: 42
End: 2022-04-12
Attending: EMERGENCY MEDICINE
Payer: OTHER MISCELLANEOUS

## 2022-04-12 ENCOUNTER — HOSPITAL ENCOUNTER (EMERGENCY)
Facility: MEDICAL CENTER | Age: 42
End: 2022-04-12
Attending: EMERGENCY MEDICINE
Payer: OTHER MISCELLANEOUS

## 2022-04-12 VITALS
HEART RATE: 88 BPM | WEIGHT: 254.63 LBS | DIASTOLIC BLOOD PRESSURE: 93 MMHG | TEMPERATURE: 97.2 F | RESPIRATION RATE: 17 BRPM | BODY MASS INDEX: 35.65 KG/M2 | OXYGEN SATURATION: 91 % | HEIGHT: 71 IN | SYSTOLIC BLOOD PRESSURE: 134 MMHG

## 2022-04-12 DIAGNOSIS — S22.42XA CLOSED FRACTURE OF MULTIPLE RIBS OF LEFT SIDE, INITIAL ENCOUNTER: ICD-10-CM

## 2022-04-12 DIAGNOSIS — T14.8XXA ABRASION: ICD-10-CM

## 2022-04-12 LAB
ALBUMIN SERPL BCP-MCNC: 4.1 G/DL (ref 3.2–4.9)
ALBUMIN/GLOB SERPL: 1.6 G/DL
ALP SERPL-CCNC: 118 U/L (ref 30–99)
ALT SERPL-CCNC: 79 U/L (ref 2–50)
ANION GAP SERPL CALC-SCNC: 14 MMOL/L (ref 7–16)
AST SERPL-CCNC: 91 U/L (ref 12–45)
BASOPHILS # BLD AUTO: 0.4 % (ref 0–1.8)
BASOPHILS # BLD: 0.03 K/UL (ref 0–0.12)
BILIRUB SERPL-MCNC: 1.3 MG/DL (ref 0.1–1.5)
BUN SERPL-MCNC: 11 MG/DL (ref 8–22)
CALCIUM SERPL-MCNC: 8.4 MG/DL (ref 8.5–10.5)
CHLORIDE SERPL-SCNC: 105 MMOL/L (ref 96–112)
CO2 SERPL-SCNC: 22 MMOL/L (ref 20–33)
CREAT SERPL-MCNC: 0.61 MG/DL (ref 0.5–1.4)
D DIMER PPP IA.FEU-MCNC: 0.89 UG/ML (FEU) (ref 0–0.5)
EKG IMPRESSION: NORMAL
EOSINOPHIL # BLD AUTO: 0.03 K/UL (ref 0–0.51)
EOSINOPHIL NFR BLD: 0.4 % (ref 0–6.9)
ERYTHROCYTE [DISTWIDTH] IN BLOOD BY AUTOMATED COUNT: 61.3 FL (ref 35.9–50)
GFR SERPLBLD CREATININE-BSD FMLA CKD-EPI: 125 ML/MIN/1.73 M 2
GLOBULIN SER CALC-MCNC: 2.5 G/DL (ref 1.9–3.5)
GLUCOSE SERPL-MCNC: 104 MG/DL (ref 65–99)
HCT VFR BLD AUTO: 39.4 % (ref 42–52)
HGB BLD-MCNC: 13.4 G/DL (ref 14–18)
IMM GRANULOCYTES # BLD AUTO: 0.04 K/UL (ref 0–0.11)
IMM GRANULOCYTES NFR BLD AUTO: 0.6 % (ref 0–0.9)
LYMPHOCYTES # BLD AUTO: 1.63 K/UL (ref 1–4.8)
LYMPHOCYTES NFR BLD: 24.2 % (ref 22–41)
MCH RBC QN AUTO: 36.2 PG (ref 27–33)
MCHC RBC AUTO-ENTMCNC: 34 G/DL (ref 33.7–35.3)
MCV RBC AUTO: 106.5 FL (ref 81.4–97.8)
MONOCYTES # BLD AUTO: 0.76 K/UL (ref 0–0.85)
MONOCYTES NFR BLD AUTO: 11.3 % (ref 0–13.4)
NEUTROPHILS # BLD AUTO: 4.24 K/UL (ref 1.82–7.42)
NEUTROPHILS NFR BLD: 63.1 % (ref 44–72)
NRBC # BLD AUTO: 0 K/UL
NRBC BLD-RTO: 0 /100 WBC
PLATELET # BLD AUTO: 222 K/UL (ref 164–446)
PMV BLD AUTO: 9.1 FL (ref 9–12.9)
POTASSIUM SERPL-SCNC: 2.8 MMOL/L (ref 3.6–5.5)
PROT SERPL-MCNC: 6.6 G/DL (ref 6–8.2)
RBC # BLD AUTO: 3.7 M/UL (ref 4.7–6.1)
SODIUM SERPL-SCNC: 141 MMOL/L (ref 135–145)
TROPONIN T SERPL-MCNC: 9 NG/L (ref 6–19)
WBC # BLD AUTO: 6.7 K/UL (ref 4.8–10.8)

## 2022-04-12 PROCEDURE — 99285 EMERGENCY DEPT VISIT HI MDM: CPT

## 2022-04-12 PROCEDURE — 93005 ELECTROCARDIOGRAM TRACING: CPT | Performed by: EMERGENCY MEDICINE

## 2022-04-12 PROCEDURE — 700102 HCHG RX REV CODE 250 W/ 637 OVERRIDE(OP): Performed by: EMERGENCY MEDICINE

## 2022-04-12 PROCEDURE — 85379 FIBRIN DEGRADATION QUANT: CPT

## 2022-04-12 PROCEDURE — 85025 COMPLETE CBC W/AUTO DIFF WBC: CPT

## 2022-04-12 PROCEDURE — 71275 CT ANGIOGRAPHY CHEST: CPT | Mod: ME

## 2022-04-12 PROCEDURE — 700111 HCHG RX REV CODE 636 W/ 250 OVERRIDE (IP): Performed by: EMERGENCY MEDICINE

## 2022-04-12 PROCEDURE — 71045 X-RAY EXAM CHEST 1 VIEW: CPT

## 2022-04-12 PROCEDURE — 84484 ASSAY OF TROPONIN QUANT: CPT

## 2022-04-12 PROCEDURE — 700117 HCHG RX CONTRAST REV CODE 255: Performed by: EMERGENCY MEDICINE

## 2022-04-12 PROCEDURE — 96375 TX/PRO/DX INJ NEW DRUG ADDON: CPT

## 2022-04-12 PROCEDURE — 93005 ELECTROCARDIOGRAM TRACING: CPT

## 2022-04-12 PROCEDURE — 36415 COLL VENOUS BLD VENIPUNCTURE: CPT

## 2022-04-12 PROCEDURE — A9270 NON-COVERED ITEM OR SERVICE: HCPCS | Performed by: EMERGENCY MEDICINE

## 2022-04-12 PROCEDURE — 80053 COMPREHEN METABOLIC PANEL: CPT

## 2022-04-12 PROCEDURE — 96376 TX/PRO/DX INJ SAME DRUG ADON: CPT | Mod: XU

## 2022-04-12 PROCEDURE — 96374 THER/PROPH/DIAG INJ IV PUSH: CPT | Mod: XU

## 2022-04-12 RX ORDER — ONDANSETRON 2 MG/ML
4 INJECTION INTRAMUSCULAR; INTRAVENOUS ONCE
Status: COMPLETED | OUTPATIENT
Start: 2022-04-12 | End: 2022-04-12

## 2022-04-12 RX ORDER — LORAZEPAM 2 MG/ML
1 INJECTION INTRAMUSCULAR ONCE
Status: COMPLETED | OUTPATIENT
Start: 2022-04-12 | End: 2022-04-12

## 2022-04-12 RX ORDER — ZOLPIDEM TARTRATE 10 MG/1
20 TABLET ORAL NIGHTLY PRN
COMMUNITY

## 2022-04-12 RX ORDER — POTASSIUM CHLORIDE 20 MEQ/1
40 TABLET, EXTENDED RELEASE ORAL DAILY
Status: DISCONTINUED | OUTPATIENT
Start: 2022-04-13 | End: 2022-04-12

## 2022-04-12 RX ORDER — POTASSIUM CHLORIDE 20 MEQ/1
40 TABLET, EXTENDED RELEASE ORAL ONCE
Status: COMPLETED | OUTPATIENT
Start: 2022-04-12 | End: 2022-04-12

## 2022-04-12 RX ORDER — PREDNISONE 10 MG/1
20 TABLET ORAL DAILY
Status: SHIPPED | COMMUNITY
End: 2022-04-19

## 2022-04-12 RX ORDER — CEPHALEXIN 500 MG/1
500 CAPSULE ORAL 4 TIMES DAILY
Qty: 40 CAPSULE | Refills: 0 | Status: SHIPPED | OUTPATIENT
Start: 2022-04-12 | End: 2022-04-19

## 2022-04-12 RX ORDER — CEPHALEXIN 500 MG/1
500 CAPSULE ORAL ONCE
Status: COMPLETED | OUTPATIENT
Start: 2022-04-12 | End: 2022-04-12

## 2022-04-12 RX ORDER — OXYCODONE HYDROCHLORIDE AND ACETAMINOPHEN 5; 325 MG/1; MG/1
1 TABLET ORAL EVERY 6 HOURS PRN
Qty: 15 TABLET | Refills: 0 | Status: SHIPPED | OUTPATIENT
Start: 2022-04-12 | End: 2022-04-15

## 2022-04-12 RX ADMIN — POTASSIUM CHLORIDE 40 MEQ: 20 TABLET, EXTENDED RELEASE ORAL at 22:13

## 2022-04-12 RX ADMIN — ONDANSETRON 4 MG: 2 INJECTION INTRAMUSCULAR; INTRAVENOUS at 22:13

## 2022-04-12 RX ADMIN — CEPHALEXIN 500 MG: 500 CAPSULE ORAL at 22:13

## 2022-04-12 RX ADMIN — IOHEXOL 75 ML: 350 INJECTION, SOLUTION INTRAVENOUS at 20:01

## 2022-04-12 RX ADMIN — FENTANYL CITRATE 100 MCG: 50 INJECTION, SOLUTION INTRAMUSCULAR; INTRAVENOUS at 22:12

## 2022-04-12 RX ADMIN — LORAZEPAM 1 MG: 2 INJECTION INTRAMUSCULAR; INTRAVENOUS at 17:43

## 2022-04-12 RX ADMIN — ONDANSETRON 4 MG: 2 INJECTION INTRAMUSCULAR; INTRAVENOUS at 17:43

## 2022-04-12 RX ADMIN — FENTANYL CITRATE 50 MCG: 50 INJECTION, SOLUTION INTRAMUSCULAR; INTRAVENOUS at 17:43

## 2022-04-12 ASSESSMENT — PAIN DESCRIPTION - DESCRIPTORS: DESCRIPTORS: SHARP;PRESSURE

## 2022-04-12 NOTE — ED NOTES
Pt back to room stating sudden onset of left sided chest pain while eating today. Pt stating severe pain and pain with left arm. Pt stating pain comes and goes. Back to room. On monitor

## 2022-04-12 NOTE — ED TRIAGE NOTES
".  Chief Complaint   Patient presents with   • Chest Pain     1430, left chest radiates to left shoulder worse with deep breath   • Shortness of Breath     Pt ambulated to triage. EKG complete on arrival. Reports \"had a couple shots\" pain worse with deep breath.   "

## 2022-04-12 NOTE — Clinical Note
Estiven Hills was seen and treated in our emergency department on 4/12/2022.  He may return to work on 04/15/2022.       If you have any questions or concerns, please don't hesitate to call.      Benjamin Granda D.O.
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
## 2022-04-13 NOTE — ED NOTES
"Pt discharged home via cab. IV discontinued and gauze placed, pt in possession of belongings. Pt provided discharge education and information pertaining to medications and follow up appointments. Pt received copy of discharge instructions and verbalized understanding. /93   Pulse 88   Temp 36.2 °C (97.2 °F) (Temporal)   Resp 17   Ht 1.803 m (5' 11\")   Wt 115 kg (254 lb 10.1 oz)   SpO2 91%   BMI 35.51 kg/m²   "

## 2022-04-15 ENCOUNTER — OFFICE VISIT (OUTPATIENT)
Dept: URGENT CARE | Facility: PHYSICIAN GROUP | Age: 42
End: 2022-04-15
Payer: MEDICARE

## 2022-04-15 VITALS
RESPIRATION RATE: 20 BRPM | HEIGHT: 71 IN | OXYGEN SATURATION: 98 % | BODY MASS INDEX: 37.8 KG/M2 | TEMPERATURE: 97.9 F | DIASTOLIC BLOOD PRESSURE: 72 MMHG | SYSTOLIC BLOOD PRESSURE: 117 MMHG | WEIGHT: 270 LBS | HEART RATE: 101 BPM

## 2022-04-15 DIAGNOSIS — M10.9 ACUTE GOUT OF LEFT ANKLE, UNSPECIFIED CAUSE: Primary | ICD-10-CM

## 2022-04-15 PROCEDURE — 99213 OFFICE O/P EST LOW 20 MIN: CPT | Performed by: NURSE PRACTITIONER

## 2022-04-15 RX ORDER — PREDNISONE 20 MG/1
40 TABLET ORAL DAILY
Qty: 10 TABLET | Refills: 0 | Status: SHIPPED
Start: 2022-04-15 | End: 2022-04-19

## 2022-04-15 ASSESSMENT — FIBROSIS 4 INDEX: FIB4 SCORE: 1.8
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
## 2022-04-15 NOTE — PATIENT INSTRUCTIONS
-Monitor for possible diet correlation and modify diet. Limit alcohol intake.  -Oral hydration.  -RICE Therapy: Rest, Ice, Compression, Elevation     Follow up with PCP. Follow up for persistent or worsening symptoms, fever, chills, signs of infection. Emergently for severe uncontrolled pain, neurovascular compromise (decreased sensation, motion, or circulation).      Gout    Gout is a condition that causes painful swelling of the joints. Gout is a type of inflammation of the joints (arthritis). This condition is caused by having too much uric acid in the body. Uric acid is a chemical that forms when the body breaks down substances called purines. Purines are important for building body proteins.  When the body has too much uric acid, sharp crystals can form and build up inside the joints. This causes pain and swelling. Gout attacks can happen quickly and may be very painful (acute gout). Over time, the attacks can affect more joints and become more frequent (chronic gout). Gout can also cause uric acid to build up under the skin and inside the kidneys.  What are the causes?  This condition is caused by too much uric acid in your blood. This can happen because:  · Your kidneys do not remove enough uric acid from your blood. This is the most common cause.  · Your body makes too much uric acid. This can happen with some cancers and cancer treatments. It can also occur if your body is breaking down too many red blood cells (hemolytic anemia).  · You eat too many foods that are high in purines. These foods include organ meats and some seafood. Alcohol, especially beer, is also high in purines.  A gout attack may be triggered by trauma or stress.  What increases the risk?  You are more likely to develop this condition if you:  · Have a family history of gout.  · Are male and middle-aged.  · Are female and have gone through menopause.  · Are obese.  · Frequently drink alcohol, especially beer.  · Are dehydrated.  · Lose  weight too quickly.  · Have an organ transplant.  · Have lead poisoning.  · Take certain medicines, including aspirin, cyclosporine, diuretics, levodopa, and niacin.  · Have kidney disease.  · Have a skin condition called psoriasis.  What are the signs or symptoms?  An attack of acute gout happens quickly. It usually occurs in just one joint. The most common place is the big toe. Attacks often start at night. Other joints that may be affected include joints of the feet, ankle, knee, fingers, wrist, or elbow. Symptoms of this condition may include:  · Severe pain.  · Warmth.  · Swelling.  · Stiffness.  · Tenderness. The affected joint may be very painful to touch.  · Shiny, red, or purple skin.  · Chills and fever.  Chronic gout may cause symptoms more frequently. More joints may be involved. You may also have white or yellow lumps (tophi) on your hands or feet or in other areas near your joints.  How is this diagnosed?  This condition is diagnosed based on your symptoms, medical history, and physical exam. You may have tests, such as:  · Blood tests to measure uric acid levels.  · Removal of joint fluid with a thin needle (aspiration) to look for uric acid crystals.  · X-rays to look for joint damage.  How is this treated?  Treatment for this condition has two phases: treating an acute attack and preventing future attacks. Acute gout treatment may include medicines to reduce pain and swelling, including:  · NSAIDs.  · Steroids. These are strong anti-inflammatory medicines that can be taken by mouth (orally) or injected into a joint.  · Colchicine. This medicine relieves pain and swelling when it is taken soon after an attack. It can be given by mouth or through an IV.  Preventive treatment may include:  · Daily use of smaller doses of NSAIDs or colchicine.  · Use of a medicine that reduces uric acid levels in your blood.  · Changes to your diet. You may need to see a dietitian about what to eat and drink to prevent  gout.  Follow these instructions at home:  During a gout attack    · If directed, put ice on the affected area:  ? Put ice in a plastic bag.  ? Place a towel between your skin and the bag.  ? Leave the ice on for 20 minutes, 2-3 times a day.  · Raise (elevate) the affected joint above the level of your heart as often as possible.  · Rest the joint as much as possible. If the affected joint is in your leg, you may be given crutches to use.  · Follow instructions from your health care provider about eating or drinking restrictions.  Avoiding future gout attacks  · Follow a low-purine diet as told by your dietitian or health care provider. Avoid foods and drinks that are high in purines, including liver, kidney, anchovies, asparagus, herring, mushrooms, mussels, and beer.  · Maintain a healthy weight or lose weight if you are overweight. If you want to lose weight, talk with your health care provider. It is important that you do not lose weight too quickly.  · Start or maintain an exercise program as told by your health care provider.  Eating and drinking  · Drink enough fluids to keep your urine pale yellow.  · If you drink alcohol:  ? Limit how much you use to:  § 0-1 drink a day for women.  § 0-2 drinks a day for men.  ? Be aware of how much alcohol is in your drink. In the [place] one drink equals one 12 oz bottle of beer (355 mL) one 5 oz glass of wine (148 mL), or one 1½ oz glass of hard liquor (44 mL).  General instructions  · Take over-the-counter and prescription medicines only as told by your health care provider.  · Do not drive or use heavy machinery while taking prescription pain medicine.  · Return to your normal activities as told by your health care provider. Ask your health care provider what activities are safe for you.  · Keep all follow-up visits as told by your health care provider. This is important.  Contact a health care provider if you have:  · Another gout attack.  · Continuing symptoms of a  gout attack after 10 days of treatment.  · Side effects from your medicines.  · Chills or a fever.  · Burning pain when you urinate.  · Pain in your lower back or belly.  Get help right away if you:  · Have severe or uncontrolled pain.  · Cannot urinate.  Summary  · Gout is painful swelling of the joints caused by inflammation.  · The most common site of pain is the big toe, but it can affect other joints in the body.  · Medicines and dietary changes can help to prevent and treat gout attacks.  This information is not intended to replace advice given to you by your health care provider. Make sure you discuss any questions you have with your health care provider.  Document Released: 12/15/2001 Document Revised: 07/10/2019 Document Reviewed: 07/10/2019  [employer] Patient Education © 2020 [employer]      Ankle Pain  The ankle joint holds your body weight and allows you to move around. Ankle pain can occur on either side or the back of one ankle or both ankles. Ankle pain may be sharp and burning or dull and aching. There may be tenderness, stiffness, redness, or warmth around the ankle. Many things can cause ankle pain, including an injury to the area and overuse of the ankle.  Follow these instructions at home:  Activity  · Rest your ankle as told by your health care provider. Avoid any activities that cause ankle pain.  · Do not use the injured limb to support your body weight until your health care provider says that you can. Use crutches as told by your health care provider.  · Do exercises as told by your health care provider.  · Ask your health care provider when it is safe to drive if you have a brace on your ankle.  If you have a brace:  · Wear the brace as told by your health care provider. Remove it only as told by your health care provider.  · Loosen the brace if your toes tingle, become numb, or turn cold and blue.  · Keep the brace clean.  · If the brace is not waterproof:  ? Do not let it get  wet.  ? Cover it with a watertight covering when you take a bath or shower.  If you were given an elastic bandage:    · Remove it when you take a bath or a shower.  · Try not to move your ankle very much, but wiggle your toes from time to time. This helps to prevent swelling.  · Adjust the bandage to make it more comfortable if it feels too tight.  · Loosen the bandage if you have numbness or tingling in your foot or if your foot turns cold and blue.  Managing pain, stiffness, and swelling    · If directed, put ice on the painful area.  ? If you have a removable brace or elastic bandage, remove it as told by your health care provider.  ? Put ice in a plastic bag.  ? Place a towel between your skin and the bag.  ? Leave the ice on for 20 minutes, 2-3 times a day.  · Move your toes often to avoid stiffness and to lessen swelling.  · Raise (elevate) your ankle above the level of your heart while you are sitting or lying down.  General instructions  · Record information about your pain. Writing down the following may be helpful for you and your health care provider:  ? How often you have ankle pain.  ? Where the pain is located.  ? What the pain feels like.  · If treatment involves wearing a prescribed shoe or insole, make sure you wear it correctly and for as long as told by your health care provider.  · Take over-the-counter and prescription medicines only as told by your health care provider.  · Keep all follow-up visits as told by your health care provider. This is important.  Contact a health care provider if:  · Your pain gets worse.  · Your pain is not relieved with medicines.  · You have a fever or chills.  · You are having more trouble with walking.  · You have new symptoms.  Get help right away if:  · Your foot, leg, toes, or ankle:  ? Tingles or becomes numb.  ? Becomes swollen.  ? Turns pale or blue.  Summary  · Ankle pain can occur on either side or the back of one ankle or both ankles.  · Ankle pain may be  sharp and burning or dull and aching.  · Rest your ankle as told by your health care provider. If told, apply ice to the area.  · Take over-the-counter and prescription medicines only as told by your health care provider.  This information is not intended to replace advice given to you by your health care provider. Make sure you discuss any questions you have with your health care provider.  Document Released: 06/07/2011 Document Revised: 04/07/2020 Document Reviewed: 06/26/2019  [employer] Patient Education © 2020 [employer]

## 2022-04-15 NOTE — LETTER
April 15, 2022         Patient: Estiven Hills   YOB: 1980   Date of Visit: 4/15/2022           To Whom it May Concern:    Estiven Hills was seen in my clinic on 4/15/2022. He {Return to school/sport/work:48522}    If you have any questions or concerns, please don't hesitate to call.        Sincerely,           JONATAN Nieto.  Electronically Signed

## 2022-04-19 ENCOUNTER — APPOINTMENT (OUTPATIENT)
Dept: RADIOLOGY | Facility: MEDICAL CENTER | Age: 42
DRG: 551 | End: 2022-04-19
Attending: EMERGENCY MEDICINE
Payer: OTHER MISCELLANEOUS

## 2022-04-19 ENCOUNTER — HOSPITAL ENCOUNTER (INPATIENT)
Facility: MEDICAL CENTER | Age: 42
LOS: 6 days | DRG: 551 | End: 2022-04-25
Attending: EMERGENCY MEDICINE | Admitting: SURGERY
Payer: OTHER MISCELLANEOUS

## 2022-04-19 ENCOUNTER — OFFICE VISIT (OUTPATIENT)
Dept: URGENT CARE | Facility: PHYSICIAN GROUP | Age: 42
End: 2022-04-19
Payer: MEDICARE

## 2022-04-19 ENCOUNTER — APPOINTMENT (OUTPATIENT)
Dept: RADIOLOGY | Facility: IMAGING CENTER | Age: 42
End: 2022-04-19
Attending: PHYSICIAN ASSISTANT
Payer: MEDICARE

## 2022-04-19 VITALS
HEART RATE: 85 BPM | OXYGEN SATURATION: 97 % | TEMPERATURE: 98.2 F | BODY MASS INDEX: 37.8 KG/M2 | DIASTOLIC BLOOD PRESSURE: 74 MMHG | RESPIRATION RATE: 20 BRPM | SYSTOLIC BLOOD PRESSURE: 130 MMHG | WEIGHT: 270 LBS | HEIGHT: 71 IN

## 2022-04-19 DIAGNOSIS — T14.8XXA HEMATOMA: ICD-10-CM

## 2022-04-19 DIAGNOSIS — S12.291A OTHER CLOSED NONDISPLACED FRACTURE OF THIRD CERVICAL VERTEBRA, INITIAL ENCOUNTER (HCC): ICD-10-CM

## 2022-04-19 DIAGNOSIS — M25.422 EFFUSION OF LEFT ELBOW: ICD-10-CM

## 2022-04-19 DIAGNOSIS — M25.552 LEFT HIP PAIN: ICD-10-CM

## 2022-04-19 DIAGNOSIS — S22.010A COMPRESSION FRACTURE OF T1 VERTEBRA, INITIAL ENCOUNTER (HCC): ICD-10-CM

## 2022-04-19 DIAGNOSIS — T14.90XA TRAUMA: ICD-10-CM

## 2022-04-19 DIAGNOSIS — S12.391A OTHER CLOSED NONDISPLACED FRACTURE OF FOURTH CERVICAL VERTEBRA, INITIAL ENCOUNTER (HCC): ICD-10-CM

## 2022-04-19 DIAGNOSIS — M79.672 ACUTE FOOT PAIN, LEFT: ICD-10-CM

## 2022-04-19 DIAGNOSIS — S72.111A CLOSED DISPLACED FRACTURE OF GREATER TROCHANTER OF RIGHT FEMUR, INITIAL ENCOUNTER (HCC): ICD-10-CM

## 2022-04-19 PROBLEM — Z53.09 CONTRAINDICATION TO DEEP VEIN THROMBOSIS (DVT) PROPHYLAXIS: Status: ACTIVE | Noted: 2022-04-19

## 2022-04-19 PROBLEM — Z11.52 ENCOUNTER FOR SCREENING FOR COVID-19: Status: ACTIVE | Noted: 2022-04-19

## 2022-04-19 PROBLEM — S22.42XA CLOSED FRACTURE OF THREE RIBS OF LEFT SIDE: Status: ACTIVE | Noted: 2022-04-19

## 2022-04-19 PROBLEM — S22.019A CLOSED FRACTURE OF FIRST THORACIC VERTEBRA (HCC): Status: ACTIVE | Noted: 2022-04-19

## 2022-04-19 PROBLEM — S12.601A CLOSED NONDISPLACED FRACTURE OF SEVENTH CERVICAL VERTEBRA (HCC): Status: ACTIVE | Noted: 2022-04-19

## 2022-04-19 PROBLEM — S32.009A CLOSED FRACTURE OF TRANSVERSE PROCESS OF LUMBAR VERTEBRA (HCC): Status: ACTIVE | Noted: 2022-04-19

## 2022-04-19 PROBLEM — S72.114A NONDISPLACED FRACTURE OF GREATER TROCHANTER OF RIGHT FEMUR, INITIAL ENCOUNTER FOR CLOSED FRACTURE (HCC): Status: ACTIVE | Noted: 2022-04-19

## 2022-04-19 PROBLEM — M1A.0710 IDIOPATHIC CHRONIC GOUT OF RIGHT FOOT: Chronic | Status: ACTIVE | Noted: 2022-04-19

## 2022-04-19 PROBLEM — S70.02XA HIP HEMATOMA, LEFT, INITIAL ENCOUNTER: Status: ACTIVE | Noted: 2022-04-19

## 2022-04-19 PROBLEM — F10.11 HISTORY OF ALCOHOL ABUSE: Chronic | Status: ACTIVE | Noted: 2022-04-19

## 2022-04-19 PROBLEM — S59.902A ELBOW INJURY, LEFT, INITIAL ENCOUNTER: Status: ACTIVE | Noted: 2022-04-19

## 2022-04-19 LAB
ALBUMIN SERPL BCP-MCNC: 3.5 G/DL (ref 3.2–4.9)
ALBUMIN/GLOB SERPL: 1.5 G/DL
ALP SERPL-CCNC: 110 U/L (ref 30–99)
ALT SERPL-CCNC: 99 U/L (ref 2–50)
ANION GAP SERPL CALC-SCNC: 11 MMOL/L (ref 7–16)
APTT PPP: 24.6 SEC (ref 24.7–36)
AST SERPL-CCNC: 84 U/L (ref 12–45)
BASOPHILS # BLD AUTO: 1.2 % (ref 0–1.8)
BASOPHILS # BLD: 0.07 K/UL (ref 0–0.12)
BILIRUB SERPL-MCNC: 1 MG/DL (ref 0.1–1.5)
BUN SERPL-MCNC: 10 MG/DL (ref 8–22)
CALCIUM SERPL-MCNC: 8.4 MG/DL (ref 8.5–10.5)
CHLORIDE SERPL-SCNC: 105 MMOL/L (ref 96–112)
CO2 SERPL-SCNC: 24 MMOL/L (ref 20–33)
CREAT SERPL-MCNC: 0.62 MG/DL (ref 0.5–1.4)
EOSINOPHIL # BLD AUTO: 0.11 K/UL (ref 0–0.51)
EOSINOPHIL NFR BLD: 1.9 % (ref 0–6.9)
ERYTHROCYTE [DISTWIDTH] IN BLOOD BY AUTOMATED COUNT: 61.9 FL (ref 35.9–50)
ETHANOL BLD-MCNC: <10.1 MG/DL (ref 0–10)
GFR SERPLBLD CREATININE-BSD FMLA CKD-EPI: 124 ML/MIN/1.73 M 2
GLOBULIN SER CALC-MCNC: 2.3 G/DL (ref 1.9–3.5)
GLUCOSE SERPL-MCNC: 94 MG/DL (ref 65–99)
HCT VFR BLD AUTO: 39.8 % (ref 42–52)
HGB BLD-MCNC: 13 G/DL (ref 14–18)
IMM GRANULOCYTES # BLD AUTO: 0.09 K/UL (ref 0–0.11)
IMM GRANULOCYTES NFR BLD AUTO: 1.6 % (ref 0–0.9)
INR PPP: 1.03 (ref 0.87–1.13)
LIPASE SERPL-CCNC: 63 U/L (ref 11–82)
LYMPHOCYTES # BLD AUTO: 0.96 K/UL (ref 1–4.8)
LYMPHOCYTES NFR BLD: 16.6 % (ref 22–41)
MCH RBC QN AUTO: 36 PG (ref 27–33)
MCHC RBC AUTO-ENTMCNC: 32.7 G/DL (ref 33.7–35.3)
MCV RBC AUTO: 110.2 FL (ref 81.4–97.8)
MONOCYTES # BLD AUTO: 0.65 K/UL (ref 0–0.85)
MONOCYTES NFR BLD AUTO: 11.2 % (ref 0–13.4)
NEUTROPHILS # BLD AUTO: 3.91 K/UL (ref 1.82–7.42)
NEUTROPHILS NFR BLD: 67.5 % (ref 44–72)
NRBC # BLD AUTO: 0 K/UL
NRBC BLD-RTO: 0 /100 WBC
PLATELET # BLD AUTO: 213 K/UL (ref 164–446)
PMV BLD AUTO: 9.7 FL (ref 9–12.9)
POTASSIUM SERPL-SCNC: 3.6 MMOL/L (ref 3.6–5.5)
PROT SERPL-MCNC: 5.8 G/DL (ref 6–8.2)
PROTHROMBIN TIME: 13.2 SEC (ref 12–14.6)
RBC # BLD AUTO: 3.61 M/UL (ref 4.7–6.1)
SODIUM SERPL-SCNC: 140 MMOL/L (ref 135–145)
WBC # BLD AUTO: 5.8 K/UL (ref 4.8–10.8)

## 2022-04-19 PROCEDURE — 99223 1ST HOSP IP/OBS HIGH 75: CPT | Performed by: SURGERY

## 2022-04-19 PROCEDURE — 73521 X-RAY EXAM HIPS BI 2 VIEWS: CPT | Mod: TC | Performed by: PHYSICIAN ASSISTANT

## 2022-04-19 PROCEDURE — 36415 COLL VENOUS BLD VENIPUNCTURE: CPT

## 2022-04-19 PROCEDURE — 700111 HCHG RX REV CODE 636 W/ 250 OVERRIDE (IP)

## 2022-04-19 PROCEDURE — 85730 THROMBOPLASTIN TIME PARTIAL: CPT

## 2022-04-19 PROCEDURE — 83690 ASSAY OF LIPASE: CPT

## 2022-04-19 PROCEDURE — 73502 X-RAY EXAM HIP UNI 2-3 VIEWS: CPT | Mod: RT

## 2022-04-19 PROCEDURE — 96375 TX/PRO/DX INJ NEW DRUG ADDON: CPT

## 2022-04-19 PROCEDURE — C9803 HOPD COVID-19 SPEC COLLECT: HCPCS | Performed by: SPECIALIST

## 2022-04-19 PROCEDURE — 72125 CT NECK SPINE W/O DYE: CPT | Mod: ME

## 2022-04-19 PROCEDURE — 72128 CT CHEST SPINE W/O DYE: CPT | Mod: ME

## 2022-04-19 PROCEDURE — 71260 CT THORAX DX C+: CPT | Mod: ME

## 2022-04-19 PROCEDURE — 80053 COMPREHEN METABOLIC PANEL: CPT

## 2022-04-19 PROCEDURE — 85025 COMPLETE CBC W/AUTO DIFF WBC: CPT

## 2022-04-19 PROCEDURE — 770001 HCHG ROOM/CARE - MED/SURG/GYN PRIV*

## 2022-04-19 PROCEDURE — 96374 THER/PROPH/DIAG INJ IV PUSH: CPT

## 2022-04-19 PROCEDURE — 96376 TX/PRO/DX INJ SAME DRUG ADON: CPT

## 2022-04-19 PROCEDURE — 700111 HCHG RX REV CODE 636 W/ 250 OVERRIDE (IP): Performed by: EMERGENCY MEDICINE

## 2022-04-19 PROCEDURE — 99214 OFFICE O/P EST MOD 30 MIN: CPT | Performed by: PHYSICIAN ASSISTANT

## 2022-04-19 PROCEDURE — 72131 CT LUMBAR SPINE W/O DYE: CPT | Mod: ME

## 2022-04-19 PROCEDURE — 99285 EMERGENCY DEPT VISIT HI MDM: CPT

## 2022-04-19 PROCEDURE — 70450 CT HEAD/BRAIN W/O DYE: CPT | Mod: ME

## 2022-04-19 PROCEDURE — 70498 CT ANGIOGRAPHY NECK: CPT | Mod: ME

## 2022-04-19 PROCEDURE — 82077 ASSAY SPEC XCP UR&BREATH IA: CPT

## 2022-04-19 PROCEDURE — 85610 PROTHROMBIN TIME: CPT

## 2022-04-19 PROCEDURE — 73080 X-RAY EXAM OF ELBOW: CPT | Mod: LT

## 2022-04-19 PROCEDURE — 700117 HCHG RX CONTRAST REV CODE 255: Performed by: EMERGENCY MEDICINE

## 2022-04-19 RX ORDER — DEXTROSE MONOHYDRATE 25 G/50ML
25 INJECTION, SOLUTION INTRAVENOUS
Status: DISCONTINUED | OUTPATIENT
Start: 2022-04-19 | End: 2022-04-21

## 2022-04-19 RX ORDER — OXYCODONE HYDROCHLORIDE 5 MG/1
5 TABLET ORAL
Status: DISCONTINUED | OUTPATIENT
Start: 2022-04-19 | End: 2022-04-25 | Stop reason: HOSPADM

## 2022-04-19 RX ORDER — AMOXICILLIN 250 MG
1 CAPSULE ORAL
Status: DISCONTINUED | OUTPATIENT
Start: 2022-04-19 | End: 2022-04-25 | Stop reason: HOSPADM

## 2022-04-19 RX ORDER — ACETAMINOPHEN 325 MG/1
650 TABLET ORAL EVERY 6 HOURS PRN
Status: DISCONTINUED | OUTPATIENT
Start: 2022-04-25 | End: 2022-04-25 | Stop reason: HOSPADM

## 2022-04-19 RX ORDER — METAXALONE 800 MG/1
400 TABLET ORAL 3 TIMES DAILY
Status: DISCONTINUED | OUTPATIENT
Start: 2022-04-20 | End: 2022-04-21

## 2022-04-19 RX ORDER — POLYETHYLENE GLYCOL 3350 17 G/17G
1 POWDER, FOR SOLUTION ORAL 2 TIMES DAILY
Status: DISCONTINUED | OUTPATIENT
Start: 2022-04-19 | End: 2022-04-25 | Stop reason: HOSPADM

## 2022-04-19 RX ORDER — SODIUM CHLORIDE 9 MG/ML
INJECTION, SOLUTION INTRAVENOUS CONTINUOUS
Status: DISCONTINUED | OUTPATIENT
Start: 2022-04-19 | End: 2022-04-21

## 2022-04-19 RX ORDER — ACETAMINOPHEN 325 MG/1
650 TABLET ORAL EVERY 4 HOURS PRN
Status: DISCONTINUED | OUTPATIENT
Start: 2022-04-19 | End: 2022-04-25 | Stop reason: HOSPADM

## 2022-04-19 RX ORDER — OXYCODONE HYDROCHLORIDE 10 MG/1
10 TABLET ORAL
Status: DISCONTINUED | OUTPATIENT
Start: 2022-04-19 | End: 2022-04-25 | Stop reason: HOSPADM

## 2022-04-19 RX ORDER — BISACODYL 10 MG
10 SUPPOSITORY, RECTAL RECTAL
Status: DISCONTINUED | OUTPATIENT
Start: 2022-04-19 | End: 2022-04-25 | Stop reason: HOSPADM

## 2022-04-19 RX ORDER — LORAZEPAM 0.5 MG/1
0.5 TABLET ORAL EVERY 4 HOURS PRN
Status: DISCONTINUED | OUTPATIENT
Start: 2022-04-19 | End: 2022-04-25 | Stop reason: HOSPADM

## 2022-04-19 RX ORDER — HYDRALAZINE HYDROCHLORIDE 20 MG/ML
10 INJECTION INTRAMUSCULAR; INTRAVENOUS EVERY 4 HOURS PRN
Status: DISCONTINUED | OUTPATIENT
Start: 2022-04-19 | End: 2022-04-25 | Stop reason: HOSPADM

## 2022-04-19 RX ORDER — HYDROMORPHONE HYDROCHLORIDE 1 MG/ML
0.5 INJECTION, SOLUTION INTRAMUSCULAR; INTRAVENOUS; SUBCUTANEOUS
Status: DISCONTINUED | OUTPATIENT
Start: 2022-04-19 | End: 2022-04-21

## 2022-04-19 RX ORDER — DOCUSATE SODIUM 100 MG/1
100 CAPSULE, LIQUID FILLED ORAL 2 TIMES DAILY
Status: DISCONTINUED | OUTPATIENT
Start: 2022-04-19 | End: 2022-04-25 | Stop reason: HOSPADM

## 2022-04-19 RX ORDER — MORPHINE SULFATE 4 MG/ML
4 INJECTION INTRAVENOUS ONCE
Status: COMPLETED | OUTPATIENT
Start: 2022-04-19 | End: 2022-04-19

## 2022-04-19 RX ORDER — PREDNISONE 10 MG/1
10-40 TABLET ORAL DAILY
Status: ON HOLD | COMMUNITY
End: 2022-04-25

## 2022-04-19 RX ORDER — LORAZEPAM 2 MG/ML
1 INJECTION INTRAMUSCULAR ONCE
Status: COMPLETED | OUTPATIENT
Start: 2022-04-19 | End: 2022-04-19

## 2022-04-19 RX ORDER — ONDANSETRON 2 MG/ML
4 INJECTION INTRAMUSCULAR; INTRAVENOUS ONCE
Status: COMPLETED | OUTPATIENT
Start: 2022-04-19 | End: 2022-04-19

## 2022-04-19 RX ORDER — ACETAMINOPHEN 325 MG/1
650 TABLET ORAL EVERY 6 HOURS
Status: DISPENSED | OUTPATIENT
Start: 2022-04-20 | End: 2022-04-24

## 2022-04-19 RX ORDER — AMOXICILLIN 250 MG
1 CAPSULE ORAL NIGHTLY
Status: DISCONTINUED | OUTPATIENT
Start: 2022-04-19 | End: 2022-04-25 | Stop reason: HOSPADM

## 2022-04-19 RX ORDER — ONDANSETRON 4 MG/1
4 TABLET, ORALLY DISINTEGRATING ORAL EVERY 4 HOURS PRN
Status: DISCONTINUED | OUTPATIENT
Start: 2022-04-19 | End: 2022-04-25 | Stop reason: HOSPADM

## 2022-04-19 RX ORDER — ACETAMINOPHEN 650 MG/1
650 SUPPOSITORY RECTAL EVERY 4 HOURS PRN
Status: DISCONTINUED | OUTPATIENT
Start: 2022-04-19 | End: 2022-04-25 | Stop reason: HOSPADM

## 2022-04-19 RX ORDER — ENEMA 19; 7 G/133ML; G/133ML
1 ENEMA RECTAL
Status: DISCONTINUED | OUTPATIENT
Start: 2022-04-19 | End: 2022-04-25 | Stop reason: HOSPADM

## 2022-04-19 RX ORDER — ONDANSETRON 2 MG/ML
4 INJECTION INTRAMUSCULAR; INTRAVENOUS EVERY 4 HOURS PRN
Status: DISCONTINUED | OUTPATIENT
Start: 2022-04-19 | End: 2022-04-25 | Stop reason: HOSPADM

## 2022-04-19 RX ADMIN — IOHEXOL 100 ML: 350 INJECTION, SOLUTION INTRAVENOUS at 23:59

## 2022-04-19 RX ADMIN — LORAZEPAM 1 MG: 2 INJECTION INTRAMUSCULAR; INTRAVENOUS at 22:47

## 2022-04-19 RX ADMIN — MORPHINE SULFATE 4 MG: 4 INJECTION INTRAVENOUS at 20:42

## 2022-04-19 RX ADMIN — IOHEXOL 100 ML: 350 INJECTION, SOLUTION INTRAVENOUS at 20:51

## 2022-04-19 RX ADMIN — ONDANSETRON 4 MG: 2 INJECTION INTRAMUSCULAR; INTRAVENOUS at 19:44

## 2022-04-19 RX ADMIN — MORPHINE SULFATE 4 MG: 4 INJECTION INTRAVENOUS at 19:44

## 2022-04-19 ASSESSMENT — FIBROSIS 4 INDEX
FIB4 SCORE: 1.8
FIB4 SCORE: 1.8

## 2022-04-19 ASSESSMENT — ENCOUNTER SYMPTOMS
MYALGIAS: 1
LOSS OF SENSATION: 0
LOSS OF MOTION: 0
FALLS: 1

## 2022-04-19 ASSESSMENT — LIFESTYLE VARIABLES: DO YOU DRINK ALCOHOL: NO

## 2022-04-19 ASSESSMENT — PAIN DESCRIPTION - PAIN TYPE: TYPE: ACUTE PAIN
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
## 2022-04-20 ENCOUNTER — APPOINTMENT (OUTPATIENT)
Dept: RADIOLOGY | Facility: MEDICAL CENTER | Age: 42
DRG: 551 | End: 2022-04-20
Attending: SPECIALIST
Payer: OTHER MISCELLANEOUS

## 2022-04-20 ENCOUNTER — APPOINTMENT (OUTPATIENT)
Dept: RADIOLOGY | Facility: MEDICAL CENTER | Age: 42
DRG: 551 | End: 2022-04-20
Attending: PHYSICIAN ASSISTANT
Payer: OTHER MISCELLANEOUS

## 2022-04-20 ENCOUNTER — APPOINTMENT (OUTPATIENT)
Dept: RADIOLOGY | Facility: MEDICAL CENTER | Age: 42
DRG: 551 | End: 2022-04-20
Attending: STUDENT IN AN ORGANIZED HEALTH CARE EDUCATION/TRAINING PROGRAM
Payer: OTHER MISCELLANEOUS

## 2022-04-20 ENCOUNTER — APPOINTMENT (OUTPATIENT)
Dept: RADIOLOGY | Facility: MEDICAL CENTER | Age: 42
DRG: 551 | End: 2022-04-20
Attending: EMERGENCY MEDICINE
Payer: OTHER MISCELLANEOUS

## 2022-04-20 LAB
ANION GAP SERPL CALC-SCNC: 9 MMOL/L (ref 7–16)
BASOPHILS # BLD AUTO: 1 % (ref 0–1.8)
BASOPHILS # BLD: 0.06 K/UL (ref 0–0.12)
BUN SERPL-MCNC: 10 MG/DL (ref 8–22)
CALCIUM SERPL-MCNC: 8.2 MG/DL (ref 8.5–10.5)
CHLORIDE SERPL-SCNC: 106 MMOL/L (ref 96–112)
CO2 SERPL-SCNC: 24 MMOL/L (ref 20–33)
CREAT SERPL-MCNC: 0.47 MG/DL (ref 0.5–1.4)
EOSINOPHIL # BLD AUTO: 0.1 K/UL (ref 0–0.51)
EOSINOPHIL NFR BLD: 1.6 % (ref 0–6.9)
ERYTHROCYTE [DISTWIDTH] IN BLOOD BY AUTOMATED COUNT: 60.4 FL (ref 35.9–50)
FLUAV RNA SPEC QL NAA+PROBE: NEGATIVE
FLUBV RNA SPEC QL NAA+PROBE: NEGATIVE
GFR SERPLBLD CREATININE-BSD FMLA CKD-EPI: 135 ML/MIN/1.73 M 2
GLUCOSE BLD STRIP.AUTO-MCNC: 97 MG/DL (ref 65–99)
GLUCOSE SERPL-MCNC: 92 MG/DL (ref 65–99)
HCT VFR BLD AUTO: 35.1 % (ref 42–52)
HGB BLD-MCNC: 11.9 G/DL (ref 14–18)
IMM GRANULOCYTES # BLD AUTO: 0.09 K/UL (ref 0–0.11)
IMM GRANULOCYTES NFR BLD AUTO: 1.5 % (ref 0–0.9)
LYMPHOCYTES # BLD AUTO: 0.91 K/UL (ref 1–4.8)
LYMPHOCYTES NFR BLD: 15 % (ref 22–41)
MCH RBC QN AUTO: 36.5 PG (ref 27–33)
MCHC RBC AUTO-ENTMCNC: 33.9 G/DL (ref 33.7–35.3)
MCV RBC AUTO: 107.7 FL (ref 81.4–97.8)
MONOCYTES # BLD AUTO: 0.75 K/UL (ref 0–0.85)
MONOCYTES NFR BLD AUTO: 12.4 % (ref 0–13.4)
NEUTROPHILS # BLD AUTO: 4.16 K/UL (ref 1.82–7.42)
NEUTROPHILS NFR BLD: 68.5 % (ref 44–72)
NRBC # BLD AUTO: 0 K/UL
NRBC BLD-RTO: 0 /100 WBC
PLATELET # BLD AUTO: 203 K/UL (ref 164–446)
PMV BLD AUTO: 9.4 FL (ref 9–12.9)
POTASSIUM SERPL-SCNC: 3.7 MMOL/L (ref 3.6–5.5)
RBC # BLD AUTO: 3.26 M/UL (ref 4.7–6.1)
RSV RNA SPEC QL NAA+PROBE: NEGATIVE
SARS-COV-2 RNA RESP QL NAA+PROBE: NOTDETECTED
SODIUM SERPL-SCNC: 139 MMOL/L (ref 135–145)
SPECIMEN SOURCE: NORMAL
WBC # BLD AUTO: 6.1 K/UL (ref 4.8–10.8)

## 2022-04-20 PROCEDURE — 770001 HCHG ROOM/CARE - MED/SURG/GYN PRIV*

## 2022-04-20 PROCEDURE — 0241U HCHG SARS-COV-2 COVID-19 NFCT DS RESP RNA 4 TRGT MIC: CPT

## 2022-04-20 PROCEDURE — 72040 X-RAY EXAM NECK SPINE 2-3 VW: CPT

## 2022-04-20 PROCEDURE — A9270 NON-COVERED ITEM OR SERVICE: HCPCS | Performed by: SPECIALIST

## 2022-04-20 PROCEDURE — 29105 APPLICATION LONG ARM SPLINT: CPT

## 2022-04-20 PROCEDURE — 700102 HCHG RX REV CODE 250 W/ 637 OVERRIDE(OP): Performed by: PHYSICIAN ASSISTANT

## 2022-04-20 PROCEDURE — 71045 X-RAY EXAM CHEST 1 VIEW: CPT

## 2022-04-20 PROCEDURE — 302874 HCHG BANDAGE ACE 2 OR 3""

## 2022-04-20 PROCEDURE — 700102 HCHG RX REV CODE 250 W/ 637 OVERRIDE(OP): Performed by: SPECIALIST

## 2022-04-20 PROCEDURE — A9270 NON-COVERED ITEM OR SERVICE: HCPCS | Performed by: PHYSICIAN ASSISTANT

## 2022-04-20 PROCEDURE — 80048 BASIC METABOLIC PNL TOTAL CA: CPT

## 2022-04-20 PROCEDURE — 700105 HCHG RX REV CODE 258: Performed by: SPECIALIST

## 2022-04-20 PROCEDURE — 96375 TX/PRO/DX INJ NEW DRUG ADDON: CPT

## 2022-04-20 PROCEDURE — 82962 GLUCOSE BLOOD TEST: CPT

## 2022-04-20 PROCEDURE — 85025 COMPLETE CBC W/AUTO DIFF WBC: CPT

## 2022-04-20 PROCEDURE — 99232 SBSQ HOSP IP/OBS MODERATE 35: CPT | Mod: FS | Performed by: PHYSICIAN ASSISTANT

## 2022-04-20 PROCEDURE — 700101 HCHG RX REV CODE 250: Performed by: PHYSICIAN ASSISTANT

## 2022-04-20 PROCEDURE — 93970 EXTREMITY STUDY: CPT

## 2022-04-20 PROCEDURE — 96376 TX/PRO/DX INJ SAME DRUG ADON: CPT

## 2022-04-20 PROCEDURE — 72141 MRI NECK SPINE W/O DYE: CPT | Mod: MG

## 2022-04-20 PROCEDURE — 700111 HCHG RX REV CODE 636 W/ 250 OVERRIDE (IP): Performed by: SPECIALIST

## 2022-04-20 PROCEDURE — 73030 X-RAY EXAM OF SHOULDER: CPT | Mod: LT

## 2022-04-20 PROCEDURE — 73630 X-RAY EXAM OF FOOT: CPT | Mod: LT

## 2022-04-20 RX ORDER — ZOLPIDEM TARTRATE 5 MG/1
10 TABLET ORAL NIGHTLY PRN
Status: DISCONTINUED | OUTPATIENT
Start: 2022-04-20 | End: 2022-04-25 | Stop reason: HOSPADM

## 2022-04-20 RX ORDER — GABAPENTIN 100 MG/1
100 CAPSULE ORAL 3 TIMES DAILY
Status: DISCONTINUED | OUTPATIENT
Start: 2022-04-20 | End: 2022-04-21

## 2022-04-20 RX ORDER — LIDOCAINE 50 MG/G
3 PATCH TOPICAL EVERY 24 HOURS
Status: DISCONTINUED | OUTPATIENT
Start: 2022-04-20 | End: 2022-04-25 | Stop reason: HOSPADM

## 2022-04-20 RX ORDER — PREDNISONE 10 MG/1
10-40 TABLET ORAL DAILY
Status: DISCONTINUED | OUTPATIENT
Start: 2022-04-21 | End: 2022-04-21

## 2022-04-20 RX ADMIN — METAXALONE 400 MG: 800 TABLET ORAL at 06:17

## 2022-04-20 RX ADMIN — OXYCODONE HYDROCHLORIDE 10 MG: 10 TABLET ORAL at 07:56

## 2022-04-20 RX ADMIN — HYDROMORPHONE HYDROCHLORIDE 0.5 MG: 1 INJECTION, SOLUTION INTRAMUSCULAR; INTRAVENOUS; SUBCUTANEOUS at 04:48

## 2022-04-20 RX ADMIN — DOCUSATE SODIUM 100 MG: 100 CAPSULE, LIQUID FILLED ORAL at 17:56

## 2022-04-20 RX ADMIN — LIDOCAINE 1 PATCH: 50 PATCH TOPICAL at 17:39

## 2022-04-20 RX ADMIN — LORAZEPAM 0.5 MG: 0.5 TABLET ORAL at 15:00

## 2022-04-20 RX ADMIN — SENNOSIDES AND DOCUSATE SODIUM 1 TABLET: 50; 8.6 TABLET ORAL at 20:42

## 2022-04-20 RX ADMIN — OXYCODONE HYDROCHLORIDE 10 MG: 10 TABLET ORAL at 20:41

## 2022-04-20 RX ADMIN — OXYCODONE HYDROCHLORIDE 10 MG: 10 TABLET ORAL at 17:38

## 2022-04-20 RX ADMIN — GABAPENTIN 100 MG: 100 CAPSULE ORAL at 17:56

## 2022-04-20 RX ADMIN — LORAZEPAM 0.5 MG: 0.5 TABLET ORAL at 20:41

## 2022-04-20 RX ADMIN — SODIUM CHLORIDE: 9 INJECTION, SOLUTION INTRAVENOUS at 00:35

## 2022-04-20 RX ADMIN — OXYCODONE HYDROCHLORIDE 10 MG: 10 TABLET ORAL at 00:46

## 2022-04-20 RX ADMIN — METAXALONE 400 MG: 800 TABLET ORAL at 11:21

## 2022-04-20 RX ADMIN — OXYCODONE HYDROCHLORIDE 10 MG: 10 TABLET ORAL at 23:49

## 2022-04-20 RX ADMIN — LORAZEPAM 0.5 MG: 0.5 TABLET ORAL at 02:38

## 2022-04-20 RX ADMIN — HYDROMORPHONE HYDROCHLORIDE 0.5 MG: 1 INJECTION, SOLUTION INTRAMUSCULAR; INTRAVENOUS; SUBCUTANEOUS at 15:00

## 2022-04-20 RX ADMIN — LORAZEPAM 0.5 MG: 0.5 TABLET ORAL at 09:18

## 2022-04-20 RX ADMIN — GABAPENTIN 100 MG: 100 CAPSULE ORAL at 15:00

## 2022-04-20 RX ADMIN — OXYCODONE HYDROCHLORIDE 10 MG: 10 TABLET ORAL at 11:21

## 2022-04-20 RX ADMIN — METAXALONE 400 MG: 800 TABLET ORAL at 17:39

## 2022-04-20 RX ADMIN — HYDROMORPHONE HYDROCHLORIDE 0.5 MG: 1 INJECTION, SOLUTION INTRAMUSCULAR; INTRAVENOUS; SUBCUTANEOUS at 09:45

## 2022-04-20 ASSESSMENT — PAIN DESCRIPTION - PAIN TYPE
TYPE: ACUTE PAIN
TYPE: ACUTE PAIN

## 2022-04-20 ASSESSMENT — ENCOUNTER SYMPTOMS
TINGLING: 0
WEAKNESS: 0
FEVER: 0
CHILLS: 0
MYALGIAS: 1
BACK PAIN: 1
ABDOMINAL PAIN: 0
NAUSEA: 0
BLURRED VISION: 0
DOUBLE VISION: 0
NECK PAIN: 1
HEADACHES: 0
VOMITING: 0

## 2022-04-20 ASSESSMENT — COPD QUESTIONNAIRES
HAVE YOU SMOKED AT LEAST 100 CIGARETTES IN YOUR ENTIRE LIFE: NO/DON'T KNOW
DO YOU EVER COUGH UP ANY MUCUS OR PHLEGM?: NO/ONLY WITH OCCASIONAL COLDS OR INFECTIONS
DURING THE PAST 4 WEEKS HOW MUCH DID YOU FEEL SHORT OF BREATH: NONE/LITTLE OF THE TIME
COPD SCREENING SCORE: 0

## 2022-04-20 NOTE — ASSESSMENT & PLAN NOTE
Fracture of the superior endplate of T1 anteriorly and on the left. Fracture of the superior facet of T1.  Non-operative management.   Cervical immobilization.on at all times. No repeat bending twisting or lifting over 10 pounds.  Fernandez Lafleur MD. [facility]. (signed off 4/23).
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
## 2022-04-20 NOTE — ASSESSMENT & PLAN NOTE
Possible anterior elbow joint effusion raises concern for occult fracture.  No tenderness and full AROM on tertiary exam.  Non-operative management.  Weight bearing status - Weightbearing as tolerated YOSELYN High MD. Orthopedic Surgeon. [facility].

## 2022-04-20 NOTE — ASSESSMENT & PLAN NOTE
Anterolateral left second through fourth rib fractures.  Aggressive pulmonary hygiene and multimodal pain management.

## 2022-04-20 NOTE — PROGRESS NOTES
"Spine Surgery Progress Note    41 y.o. male with left C7-T1 facet fracture.  Appears stable on upright radiographs    S: Doing well overnight. GERSON.  No new neurologic deficits    O:  /76   Pulse 80   Temp 36.4 °C (97.6 °F) (Temporal)   Resp 16   Ht 1.778 m (5' 10\")   Wt 122 kg (270 lb)   SpO2 92%   BMI 38.74 kg/m²     Gen: WNWD NAD  Breathing comfortably    Dressing CDI  Cervical    Deltoid  Bi  Tri  WE  Flex  Finger Flexion  Right      5  5   5   5     5   5  Left      5  5   5   5    5   5    SILT C5-T1 BUE except: Decreased sensation left C8         Lab Results   Component Value Date/Time    WBC 6.1 04/20/2022 03:44 AM    RBC 3.26 (L) 04/20/2022 03:44 AM    HEMOGLOBIN 11.9 (L) 04/20/2022 03:44 AM    HEMATOCRIT 35.1 (L) 04/20/2022 03:44 AM    .7 (H) 04/20/2022 03:44 AM    MCH 36.5 (H) 04/20/2022 03:44 AM    MCHC 33.9 04/20/2022 03:44 AM    MPV 9.4 04/20/2022 03:44 AM    NEUTSPOLYS 68.50 04/20/2022 03:44 AM    LYMPHOCYTES 15.00 (L) 04/20/2022 03:44 AM    MONOCYTES 12.40 04/20/2022 03:44 AM    EOSINOPHILS 1.60 04/20/2022 03:44 AM    BASOPHILS 1.00 04/20/2022 03:44 AM    ANISOCYTOSIS 1+ 05/28/2016 02:25 AM      Lab Results   Component Value Date/Time    SODIUM 139 04/20/2022 03:44 AM    POTASSIUM 3.7 04/20/2022 03:44 AM    CHLORIDE 106 04/20/2022 03:44 AM    CO2 24 04/20/2022 03:44 AM    GLUCOSE 92 04/20/2022 03:44 AM    BUN 10 04/20/2022 03:44 AM    CREATININE 0.47 (L) 04/20/2022 03:44 AM          AP: 41 y.o. male with left C7-T1 facet fracture.  Patient is neurologically intact with exception of slightly decreased sensation in the left C8 distribution likely related to the fracture and foraminal stenosis.  I discussed the pros and cons of surgery versus nonoperative treatment.  At this point the patient agreed to proceed forward with an attempt at nonoperative treatment.  -Cervical collar full-time  -No repeat bending twisting or lifting over 10 pounds  -Patient will schedule an appointment 1 " week from today for follow-up  -Office contact information provided to patient

## 2022-04-20 NOTE — ASSESSMENT & PLAN NOTE
Prophylactic anticoagulation for thrombotic prevention initially contraindicated secondary to elevated bleeding risk.  4/20 Trauma screening bilateral lower extremity venous duplex negative for above knee DVT.  4/20 Prophylactic dose enoxaparin initiated.   Systemic anticoagulation approved by Orthopedic Surgery.

## 2022-04-20 NOTE — ED NOTES
Blood work sent, pt has taken off left arm splint, pt is more comfortable without it on, provider notified and okay with it off. Pt placed in [facility] with two rns. Pt states this is more comfortable.
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
## 2022-04-20 NOTE — ASSESSMENT & PLAN NOTE
C7 left inferior facet fracture extending to the transverse process.  Nondisplaced fractures of the transverse processes of C3 and C4 on the left.  CTA negative for occlusion and dissection.  4/20 MRI shows mild edema in paraspinous musculature and adjacent soft tissues at the C5-C6-C7 level, no spinal cord injury.  4/22 Increased pain and numbness on the LUE.  - Repeat CT Cspine with hairline fracture on the facet joint on the right at C4-5.  - Increased gabapentin dose.  - No surgery.  Non-operative management.   Cervical immobilization. on at all times. No repeat bending twisting or lifting over 10 pounds.  Fernandez Lafleur MD. [facility]. (signed off 4/23).  Follow up in clinic next week.

## 2022-04-20 NOTE — ED NOTES
PT called. RN answered call light. PT reports he removed his Left arm splint due to it is not comfortable. Primary RN updated.
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
## 2022-04-20 NOTE — ASSESSMENT & PLAN NOTE
Comminuted right greater trochanter fracture.  Non-operative management.  Weight bearing status - Weightbearing as tolerated RLE.  If unable to weight bear, will need MRI.  Carlos High MD. Orthopedic Surgeon. [facility].
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
## 2022-04-20 NOTE — ASSESSMENT & PLAN NOTE
Chronic condition treated with prednisone as needed.   Resumed maintenance medication on admission.

## 2022-04-20 NOTE — ED NOTES
Pt stating to feel much better after dose of IV dilaudid, pt states break through pain gone. Pt concerned about pain management

## 2022-04-21 ENCOUNTER — APPOINTMENT (OUTPATIENT)
Dept: RADIOLOGY | Facility: MEDICAL CENTER | Age: 42
DRG: 551 | End: 2022-04-21
Attending: SPECIALIST
Payer: OTHER MISCELLANEOUS

## 2022-04-21 PROBLEM — Z78.9 NO CONTRAINDICATION TO DEEP VEIN THROMBOSIS (DVT) PROPHYLAXIS: Status: ACTIVE | Noted: 2022-04-19

## 2022-04-21 PROBLEM — M25.512 ACUTE PAIN OF LEFT SHOULDER: Status: ACTIVE | Noted: 2022-04-21

## 2022-04-21 PROBLEM — M79.672 ACUTE FOOT PAIN, LEFT: Status: ACTIVE | Noted: 2022-04-21

## 2022-04-21 PROBLEM — Z02.9 DISCHARGE PLANNING ISSUES: Status: ACTIVE | Noted: 2022-04-21

## 2022-04-21 LAB
ANION GAP SERPL CALC-SCNC: 9 MMOL/L (ref 7–16)
BASOPHILS # BLD AUTO: 0.7 % (ref 0–1.8)
BASOPHILS # BLD: 0.05 K/UL (ref 0–0.12)
BUN SERPL-MCNC: 13 MG/DL (ref 8–22)
CALCIUM SERPL-MCNC: 8.5 MG/DL (ref 8.5–10.5)
CHLORIDE SERPL-SCNC: 99 MMOL/L (ref 96–112)
CO2 SERPL-SCNC: 23 MMOL/L (ref 20–33)
CREAT SERPL-MCNC: 0.6 MG/DL (ref 0.5–1.4)
EOSINOPHIL # BLD AUTO: 0.08 K/UL (ref 0–0.51)
EOSINOPHIL NFR BLD: 1.1 % (ref 0–6.9)
ERYTHROCYTE [DISTWIDTH] IN BLOOD BY AUTOMATED COUNT: 58.7 FL (ref 35.9–50)
GFR SERPLBLD CREATININE-BSD FMLA CKD-EPI: 125 ML/MIN/1.73 M 2
GLUCOSE SERPL-MCNC: 119 MG/DL (ref 65–99)
HCT VFR BLD AUTO: 37.2 % (ref 42–52)
HGB BLD-MCNC: 12.5 G/DL (ref 14–18)
IMM GRANULOCYTES # BLD AUTO: 0.11 K/UL (ref 0–0.11)
IMM GRANULOCYTES NFR BLD AUTO: 1.5 % (ref 0–0.9)
LYMPHOCYTES # BLD AUTO: 0.61 K/UL (ref 1–4.8)
LYMPHOCYTES NFR BLD: 8.3 % (ref 22–41)
MAGNESIUM SERPL-MCNC: 1.7 MG/DL (ref 1.5–2.5)
MCH RBC QN AUTO: 36.2 PG (ref 27–33)
MCHC RBC AUTO-ENTMCNC: 33.6 G/DL (ref 33.7–35.3)
MCV RBC AUTO: 107.8 FL (ref 81.4–97.8)
MONOCYTES # BLD AUTO: 1.12 K/UL (ref 0–0.85)
MONOCYTES NFR BLD AUTO: 15.2 % (ref 0–13.4)
NEUTROPHILS # BLD AUTO: 5.38 K/UL (ref 1.82–7.42)
NEUTROPHILS NFR BLD: 73.2 % (ref 44–72)
NRBC # BLD AUTO: 0 K/UL
NRBC BLD-RTO: 0 /100 WBC
PHOSPHATE SERPL-MCNC: 3 MG/DL (ref 2.5–4.5)
PLATELET # BLD AUTO: 191 K/UL (ref 164–446)
PMV BLD AUTO: 9.7 FL (ref 9–12.9)
POTASSIUM SERPL-SCNC: 4.1 MMOL/L (ref 3.6–5.5)
RBC # BLD AUTO: 3.45 M/UL (ref 4.7–6.1)
SODIUM SERPL-SCNC: 131 MMOL/L (ref 135–145)
WBC # BLD AUTO: 7.4 K/UL (ref 4.8–10.8)

## 2022-04-21 PROCEDURE — 770001 HCHG ROOM/CARE - MED/SURG/GYN PRIV*

## 2022-04-21 PROCEDURE — 80048 BASIC METABOLIC PNL TOTAL CA: CPT

## 2022-04-21 PROCEDURE — 700101 HCHG RX REV CODE 250

## 2022-04-21 PROCEDURE — A9270 NON-COVERED ITEM OR SERVICE: HCPCS | Performed by: SPECIALIST

## 2022-04-21 PROCEDURE — 700102 HCHG RX REV CODE 250 W/ 637 OVERRIDE(OP): Performed by: SPECIALIST

## 2022-04-21 PROCEDURE — A9270 NON-COVERED ITEM OR SERVICE: HCPCS

## 2022-04-21 PROCEDURE — 96375 TX/PRO/DX INJ NEW DRUG ADDON: CPT

## 2022-04-21 PROCEDURE — 96376 TX/PRO/DX INJ SAME DRUG ADON: CPT

## 2022-04-21 PROCEDURE — 71045 X-RAY EXAM CHEST 1 VIEW: CPT

## 2022-04-21 PROCEDURE — 94760 N-INVAS EAR/PLS OXIMETRY 1: CPT

## 2022-04-21 PROCEDURE — 700111 HCHG RX REV CODE 636 W/ 250 OVERRIDE (IP): Performed by: SPECIALIST

## 2022-04-21 PROCEDURE — 85025 COMPLETE CBC W/AUTO DIFF WBC: CPT

## 2022-04-21 PROCEDURE — A9270 NON-COVERED ITEM OR SERVICE: HCPCS | Performed by: PHYSICIAN ASSISTANT

## 2022-04-21 PROCEDURE — 700102 HCHG RX REV CODE 250 W/ 637 OVERRIDE(OP): Performed by: PHYSICIAN ASSISTANT

## 2022-04-21 PROCEDURE — 94669 MECHANICAL CHEST WALL OSCILL: CPT

## 2022-04-21 PROCEDURE — 700111 HCHG RX REV CODE 636 W/ 250 OVERRIDE (IP)

## 2022-04-21 PROCEDURE — 84100 ASSAY OF PHOSPHORUS: CPT

## 2022-04-21 PROCEDURE — 83735 ASSAY OF MAGNESIUM: CPT

## 2022-04-21 PROCEDURE — 99232 SBSQ HOSP IP/OBS MODERATE 35: CPT | Mod: FS

## 2022-04-21 PROCEDURE — 36415 COLL VENOUS BLD VENIPUNCTURE: CPT

## 2022-04-21 PROCEDURE — 700102 HCHG RX REV CODE 250 W/ 637 OVERRIDE(OP)

## 2022-04-21 RX ORDER — PREDNISONE 20 MG/1
40 TABLET ORAL ONCE
Status: COMPLETED | OUTPATIENT
Start: 2022-04-21 | End: 2022-04-21

## 2022-04-21 RX ORDER — CHOLECALCIFEROL (VITAMIN D3) 125 MCG
5 CAPSULE ORAL NIGHTLY PRN
Status: DISCONTINUED | OUTPATIENT
Start: 2022-04-21 | End: 2022-04-25 | Stop reason: HOSPADM

## 2022-04-21 RX ORDER — METAXALONE 800 MG/1
800 TABLET ORAL 3 TIMES DAILY
Status: DISCONTINUED | OUTPATIENT
Start: 2022-04-21 | End: 2022-04-25 | Stop reason: HOSPADM

## 2022-04-21 RX ORDER — PREDNISONE 10 MG/1
10 TABLET ORAL DAILY
Status: DISCONTINUED | OUTPATIENT
Start: 2022-04-27 | End: 2022-04-25 | Stop reason: HOSPADM

## 2022-04-21 RX ORDER — PREDNISONE 20 MG/1
40 TABLET ORAL DAILY
Status: COMPLETED | OUTPATIENT
Start: 2022-04-21 | End: 2022-04-22

## 2022-04-21 RX ORDER — GABAPENTIN 100 MG/1
200 CAPSULE ORAL 3 TIMES DAILY
Status: DISCONTINUED | OUTPATIENT
Start: 2022-04-21 | End: 2022-04-22

## 2022-04-21 RX ORDER — PREDNISONE 20 MG/1
20 TABLET ORAL DAILY
Status: DISCONTINUED | OUTPATIENT
Start: 2022-04-25 | End: 2022-04-25 | Stop reason: HOSPADM

## 2022-04-21 RX ADMIN — Medication 5 MG: at 00:11

## 2022-04-21 RX ADMIN — LIDOCAINE 3 PATCH: 50 PATCH TOPICAL at 13:56

## 2022-04-21 RX ADMIN — OXYCODONE HYDROCHLORIDE 10 MG: 10 TABLET ORAL at 16:31

## 2022-04-21 RX ADMIN — PREDNISONE 40 MG: 20 TABLET ORAL at 09:32

## 2022-04-21 RX ADMIN — LORAZEPAM 0.5 MG: 0.5 TABLET ORAL at 05:55

## 2022-04-21 RX ADMIN — GABAPENTIN 100 MG: 100 CAPSULE ORAL at 04:37

## 2022-04-21 RX ADMIN — OXYCODONE HYDROCHLORIDE 10 MG: 10 TABLET ORAL at 02:55

## 2022-04-21 RX ADMIN — DOCUSATE SODIUM 100 MG: 100 CAPSULE, LIQUID FILLED ORAL at 04:37

## 2022-04-21 RX ADMIN — METAXALONE 400 MG: 800 TABLET ORAL at 04:37

## 2022-04-21 RX ADMIN — PREDNISONE 40 MG: 20 TABLET ORAL at 01:25

## 2022-04-21 RX ADMIN — OXYCODONE HYDROCHLORIDE 10 MG: 10 TABLET ORAL at 20:17

## 2022-04-21 RX ADMIN — GABAPENTIN 200 MG: 100 CAPSULE ORAL at 11:24

## 2022-04-21 RX ADMIN — SENNOSIDES AND DOCUSATE SODIUM 1 TABLET: 50; 8.6 TABLET ORAL at 20:17

## 2022-04-21 RX ADMIN — METAXALONE 800 MG: 800 TABLET ORAL at 11:24

## 2022-04-21 RX ADMIN — OXYCODONE HYDROCHLORIDE 10 MG: 10 TABLET ORAL at 13:16

## 2022-04-21 RX ADMIN — ZOLPIDEM TARTRATE 10 MG: 5 TABLET ORAL at 01:28

## 2022-04-21 RX ADMIN — OXYCODONE HYDROCHLORIDE 10 MG: 10 TABLET ORAL at 08:59

## 2022-04-21 RX ADMIN — DOCUSATE SODIUM 100 MG: 100 CAPSULE, LIQUID FILLED ORAL at 16:31

## 2022-04-21 RX ADMIN — HYDROMORPHONE HYDROCHLORIDE 0.5 MG: 1 INJECTION, SOLUTION INTRAMUSCULAR; INTRAVENOUS; SUBCUTANEOUS at 00:52

## 2022-04-21 RX ADMIN — METAXALONE 800 MG: 800 TABLET ORAL at 16:32

## 2022-04-21 RX ADMIN — OXYCODONE HYDROCHLORIDE 10 MG: 10 TABLET ORAL at 23:19

## 2022-04-21 RX ADMIN — GABAPENTIN 200 MG: 100 CAPSULE ORAL at 16:31

## 2022-04-21 RX ADMIN — OXYCODONE HYDROCHLORIDE 10 MG: 10 TABLET ORAL at 05:55

## 2022-04-21 ASSESSMENT — COGNITIVE AND FUNCTIONAL STATUS - GENERAL
WALKING IN HOSPITAL ROOM: A LITTLE
TOILETING: A LITTLE
SUGGESTED CMS G CODE MODIFIER MOBILITY: CK
STANDING UP FROM CHAIR USING ARMS: A LITTLE
MOVING FROM LYING ON BACK TO SITTING ON SIDE OF FLAT BED: A LITTLE
CLIMB 3 TO 5 STEPS WITH RAILING: A LITTLE
TURNING FROM BACK TO SIDE WHILE IN FLAT BAD: A LITTLE
SUGGESTED CMS G CODE MODIFIER DAILY ACTIVITY: CJ
DAILY ACTIVITIY SCORE: 20
HELP NEEDED FOR BATHING: A LITTLE
MOBILITY SCORE: 18
DRESSING REGULAR UPPER BODY CLOTHING: A LITTLE
DRESSING REGULAR LOWER BODY CLOTHING: A LITTLE
MOVING TO AND FROM BED TO CHAIR: A LITTLE

## 2022-04-21 ASSESSMENT — ENCOUNTER SYMPTOMS
MYALGIAS: 1
HEADACHES: 0
TINGLING: 0
BLURRED VISION: 0
DOUBLE VISION: 0
WEAKNESS: 0
NECK PAIN: 1
FEVER: 0
NAUSEA: 0
VOMITING: 0
ABDOMINAL PAIN: 0
ROS GI COMMENTS: LAST BM 4/20
CHILLS: 0
BACK PAIN: 1

## 2022-04-21 ASSESSMENT — LIFESTYLE VARIABLES
ALCOHOL_USE: NO
TOTAL SCORE: 0
HAVE PEOPLE ANNOYED YOU BY CRITICIZING YOUR DRINKING: NO
TOTAL SCORE: 0
TOTAL SCORE: 0
ON A TYPICAL DAY WHEN YOU DRINK ALCOHOL HOW MANY DRINKS DO YOU HAVE: 0
AVERAGE NUMBER OF DAYS PER WEEK YOU HAVE A DRINK CONTAINING ALCOHOL: 0
HOW MANY TIMES IN THE PAST YEAR HAVE YOU HAD 5 OR MORE DRINKS IN A DAY: 0
EVER FELT BAD OR GUILTY ABOUT YOUR DRINKING: NO
HAVE YOU EVER FELT YOU SHOULD CUT DOWN ON YOUR DRINKING: NO
DOES PATIENT WANT TO STOP DRINKING: NO
EVER HAD A DRINK FIRST THING IN THE MORNING TO STEADY YOUR NERVES TO GET RID OF A HANGOVER: NO
CONSUMPTION TOTAL: NEGATIVE

## 2022-04-21 ASSESSMENT — PAIN DESCRIPTION - PAIN TYPE
TYPE: ACUTE PAIN;SURGICAL PAIN
TYPE: ACUTE PAIN
TYPE: ACUTE PAIN;SURGICAL PAIN
TYPE: ACUTE PAIN;SURGICAL PAIN

## 2022-04-21 ASSESSMENT — PATIENT HEALTH QUESTIONNAIRE - PHQ9
2. FEELING DOWN, DEPRESSED, IRRITABLE, OR HOPELESS: NOT AT ALL
SUM OF ALL RESPONSES TO PHQ9 QUESTIONS 1 AND 2: 0
1. LITTLE INTEREST OR PLEASURE IN DOING THINGS: NOT AT ALL

## 2022-04-21 NOTE — THERAPY
Missed Occupational Therapy     Patient Name: Estiven Hills  Age:  41 y.o., Sex:  male  Medical Record #: [id number]  Today's Date: 4/21/2022    Discussed missed therapy with RN       04/21/22 4563   Interdisciplinary Plan of Care Collaboration   Collaboration Comments Attempted OT eval. Pt declined OOB activity at this time due to gout pain in left foot. He reports that he will be able to participate in therapy tomorrow, following 3rd dose of prednisone.

## 2022-04-21 NOTE — ASSESSMENT & PLAN NOTE
Date of admission: 4/19/2022.  4/19 Transfer orders to [facility].  4/23 PT/OT recommend home health. Referrals placed.   4/25 [facility] accepted.  Cleared for discharge: Yes - Date: 4/24/22.  Discharge delayed: Yes - Reason: Awaiting acceptance from Home Health. Possibly Monday 4/25.  Discharge date: tbd.

## 2022-04-21 NOTE — CARE PLAN
The patient is Stable - Low risk of patient condition declining or worsening    Shift Goals  Clinical Goals: Pain management, mobilize  Patient Goals: Pain management, control gout  flareup  Family Goals: n/a    Progress made toward(s) clinical / shift goals:    Problem: Pain - Standard  Goal: Alleviation of pain or a reduction in pain to the patient’s comfort goal  Outcome: Progressing  Note: Discussed pain management with patient including medications and non-pharmacologic options like ice. Patient has been utilizing both, see administrations per MAR.      Problem: Knowledge Deficit - Standard  Goal: Patient and family/care givers will demonstrate understanding of plan of care, disease process/condition, diagnostic tests and medications  Outcome: Progressing  Note: Discussed POC with patient including gout management, pain control, brace usage, and mobility. The patient's pain has been improving throughout the day and originally planned to mobilize at 1400. However OT attempted to work with the patient at 1430 but he said he did not feel ready yet. Educated patient on importance of mobility and the patient indicated understanding.
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
## 2022-04-21 NOTE — PROGRESS NOTES
"Spine Surgery Progress Note    41 y.o. male with left C7-T1 facet fracture.  Appears stable on upright radiographs    S: Doing well overnight. GERSON.  No new neurologic deficits    O:  /80   Pulse 91   Temp 36.6 °C (97.8 °F) (Temporal)   Resp 16   Ht 1.778 m (5' 10\")   Wt 122 kg (270 lb)   SpO2 94%   BMI 38.74 kg/m²     Gen: WNWD NAD  Breathing comfortably    Dressing CDI  Cervical    Deltoid  Bi  Tri  WE  Flex  Finger Flexion  Right      5  5   5   5     5   5  Left      5  5   5   5    5   5    SILT C5-T1 BUE except: Decreased sensation left C8         Lab Results   Component Value Date/Time    WBC 7.4 04/21/2022 03:48 AM    RBC 3.45 (L) 04/21/2022 03:48 AM    HEMOGLOBIN 12.5 (L) 04/21/2022 03:48 AM    HEMATOCRIT 37.2 (L) 04/21/2022 03:48 AM    .8 (H) 04/21/2022 03:48 AM    MCH 36.2 (H) 04/21/2022 03:48 AM    MCHC 33.6 (L) 04/21/2022 03:48 AM    MPV 9.7 04/21/2022 03:48 AM    NEUTSPOLYS 73.20 (H) 04/21/2022 03:48 AM    LYMPHOCYTES 8.30 (L) 04/21/2022 03:48 AM    MONOCYTES 15.20 (H) 04/21/2022 03:48 AM    EOSINOPHILS 1.10 04/21/2022 03:48 AM    BASOPHILS 0.70 04/21/2022 03:48 AM    ANISOCYTOSIS 1+ 05/28/2016 02:25 AM      Lab Results   Component Value Date/Time    SODIUM 131 (L) 04/21/2022 03:48 AM    POTASSIUM 4.1 04/21/2022 03:48 AM    CHLORIDE 99 04/21/2022 03:48 AM    CO2 23 04/21/2022 03:48 AM    GLUCOSE 119 (H) 04/21/2022 03:48 AM    BUN 13 04/21/2022 03:48 AM    CREATININE 0.60 04/21/2022 03:48 AM          AP: 41 y.o. male with left C7-T1 facet fracture.  Plan for nonoperative care    -Cervical collar full-time  -Patient may benefit from getting a shower collar -sifonr collar usually works well for this  -No repeat bending twisting or lifting over 10 pounds  -Patient will schedule an appointment 1 week from today for follow-up  -Office contact information provided to patient     "
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
## 2022-04-21 NOTE — PROGRESS NOTES
"0100 Patient complaining of gout flare up, requesting course of prednisone, which he was taking at home. Notified on-call trauma NP, Rekha who ordered course of prednisone. First scheduled dose of prednisone due at 0600, patient requesting to have it \"right now\" and stated \"do I have to check myself out and go to [facility] to get this handled?\" Updated Rekha, new orders received.    Patient also complaining of breakthrough pain, gave dilaudid one-time overnight.   "

## 2022-04-21 NOTE — CARE PLAN
The patient is Stable - Low risk of patient condition declining or worsening    Shift Goals  Clinical Goals: pain control  Patient Goals: pain control  Family Goals: n/a    Progress made toward(s) clinical / shift goals:  yes    Problem: Pain - Standard  Goal: Alleviation of pain or a reduction in pain to the patient’s comfort goal  Outcome: Progressing   Using 0-10 scale to assess for pain, multi-modal pain interventions in place. Having to use breakthrough pain medication overnight.    Problem: Knowledge Deficit - Standard  Goal: Patient and family/care givers will demonstrate understanding of plan of care, disease process/condition, diagnostic tests and medications  Outcome: Progressing

## 2022-04-22 ENCOUNTER — APPOINTMENT (OUTPATIENT)
Dept: RADIOLOGY | Facility: MEDICAL CENTER | Age: 42
DRG: 551 | End: 2022-04-22
Attending: NURSE PRACTITIONER
Payer: OTHER MISCELLANEOUS

## 2022-04-22 ENCOUNTER — PATIENT OUTREACH (OUTPATIENT)
Dept: HEALTH INFORMATION MANAGEMENT | Facility: OTHER | Age: 42
End: 2022-04-22
Payer: MEDICARE

## 2022-04-22 ENCOUNTER — APPOINTMENT (OUTPATIENT)
Dept: RADIOLOGY | Facility: MEDICAL CENTER | Age: 42
DRG: 551 | End: 2022-04-22
Attending: SPECIALIST
Payer: OTHER MISCELLANEOUS

## 2022-04-22 ENCOUNTER — HOSPITAL ENCOUNTER (OUTPATIENT)
Facility: MEDICAL CENTER | Age: 42
End: 2022-04-22
Attending: NEUROLOGICAL SURGERY | Admitting: NEUROLOGICAL SURGERY
Payer: MEDICARE

## 2022-04-22 ENCOUNTER — APPOINTMENT (OUTPATIENT)
Dept: RADIOLOGY | Facility: MEDICAL CENTER | Age: 42
DRG: 551 | End: 2022-04-22
Attending: PHYSICIAN ASSISTANT
Payer: OTHER MISCELLANEOUS

## 2022-04-22 DIAGNOSIS — M48.02 CERVICAL STENOSIS OF SPINE: ICD-10-CM

## 2022-04-22 DIAGNOSIS — G95.9 CERVICAL MYELOPATHY (HCC): ICD-10-CM

## 2022-04-22 LAB
ANION GAP SERPL CALC-SCNC: 9 MMOL/L (ref 7–16)
BASOPHILS # BLD AUTO: 0.4 % (ref 0–1.8)
BASOPHILS # BLD: 0.04 K/UL (ref 0–0.12)
BUN SERPL-MCNC: 21 MG/DL (ref 8–22)
CALCIUM SERPL-MCNC: 9.1 MG/DL (ref 8.5–10.5)
CHLORIDE SERPL-SCNC: 100 MMOL/L (ref 96–112)
CO2 SERPL-SCNC: 26 MMOL/L (ref 20–33)
CREAT SERPL-MCNC: 0.75 MG/DL (ref 0.5–1.4)
EOSINOPHIL # BLD AUTO: 0.06 K/UL (ref 0–0.51)
EOSINOPHIL NFR BLD: 0.6 % (ref 0–6.9)
ERYTHROCYTE [DISTWIDTH] IN BLOOD BY AUTOMATED COUNT: 56.8 FL (ref 35.9–50)
GFR SERPLBLD CREATININE-BSD FMLA CKD-EPI: 117 ML/MIN/1.73 M 2
GLUCOSE SERPL-MCNC: 106 MG/DL (ref 65–99)
HCT VFR BLD AUTO: 36.5 % (ref 42–52)
HGB BLD-MCNC: 12.1 G/DL (ref 14–18)
IMM GRANULOCYTES # BLD AUTO: 0.1 K/UL (ref 0–0.11)
IMM GRANULOCYTES NFR BLD AUTO: 1 % (ref 0–0.9)
LYMPHOCYTES # BLD AUTO: 1.15 K/UL (ref 1–4.8)
LYMPHOCYTES NFR BLD: 11 % (ref 22–41)
MCH RBC QN AUTO: 35.7 PG (ref 27–33)
MCHC RBC AUTO-ENTMCNC: 33.2 G/DL (ref 33.7–35.3)
MCV RBC AUTO: 107.7 FL (ref 81.4–97.8)
MONOCYTES # BLD AUTO: 1.37 K/UL (ref 0–0.85)
MONOCYTES NFR BLD AUTO: 13.1 % (ref 0–13.4)
NEUTROPHILS # BLD AUTO: 7.73 K/UL (ref 1.82–7.42)
NEUTROPHILS NFR BLD: 73.9 % (ref 44–72)
NRBC # BLD AUTO: 0 K/UL
NRBC BLD-RTO: 0 /100 WBC
PLATELET # BLD AUTO: 204 K/UL (ref 164–446)
PMV BLD AUTO: 10.1 FL (ref 9–12.9)
POTASSIUM SERPL-SCNC: 3.9 MMOL/L (ref 3.6–5.5)
RBC # BLD AUTO: 3.39 M/UL (ref 4.7–6.1)
SODIUM SERPL-SCNC: 135 MMOL/L (ref 135–145)
WBC # BLD AUTO: 10.5 K/UL (ref 4.8–10.8)

## 2022-04-22 PROCEDURE — 97162 PT EVAL MOD COMPLEX 30 MIN: CPT

## 2022-04-22 PROCEDURE — 36415 COLL VENOUS BLD VENIPUNCTURE: CPT

## 2022-04-22 PROCEDURE — 97166 OT EVAL MOD COMPLEX 45 MIN: CPT

## 2022-04-22 PROCEDURE — 85025 COMPLETE CBC W/AUTO DIFF WBC: CPT

## 2022-04-22 PROCEDURE — 73080 X-RAY EXAM OF ELBOW: CPT | Mod: LT

## 2022-04-22 PROCEDURE — 700102 HCHG RX REV CODE 250 W/ 637 OVERRIDE(OP): Performed by: PHYSICIAN ASSISTANT

## 2022-04-22 PROCEDURE — 700102 HCHG RX REV CODE 250 W/ 637 OVERRIDE(OP)

## 2022-04-22 PROCEDURE — A9270 NON-COVERED ITEM OR SERVICE: HCPCS

## 2022-04-22 PROCEDURE — 700101 HCHG RX REV CODE 250

## 2022-04-22 PROCEDURE — 700111 HCHG RX REV CODE 636 W/ 250 OVERRIDE (IP): Performed by: NEUROLOGICAL SURGERY

## 2022-04-22 PROCEDURE — 72125 CT NECK SPINE W/O DYE: CPT | Mod: MG

## 2022-04-22 PROCEDURE — 99232 SBSQ HOSP IP/OBS MODERATE 35: CPT | Mod: FS

## 2022-04-22 PROCEDURE — 97535 SELF CARE MNGMENT TRAINING: CPT

## 2022-04-22 PROCEDURE — 71045 X-RAY EXAM CHEST 1 VIEW: CPT

## 2022-04-22 PROCEDURE — 770001 HCHG ROOM/CARE - MED/SURG/GYN PRIV*

## 2022-04-22 PROCEDURE — 700111 HCHG RX REV CODE 636 W/ 250 OVERRIDE (IP): Performed by: PHYSICIAN ASSISTANT

## 2022-04-22 PROCEDURE — 94669 MECHANICAL CHEST WALL OSCILL: CPT

## 2022-04-22 PROCEDURE — 99024 POSTOP FOLLOW-UP VISIT: CPT | Performed by: PHYSICIAN ASSISTANT

## 2022-04-22 PROCEDURE — 80048 BASIC METABOLIC PNL TOTAL CA: CPT

## 2022-04-22 PROCEDURE — A9270 NON-COVERED ITEM OR SERVICE: HCPCS | Performed by: SPECIALIST

## 2022-04-22 PROCEDURE — A9270 NON-COVERED ITEM OR SERVICE: HCPCS | Performed by: PHYSICIAN ASSISTANT

## 2022-04-22 PROCEDURE — 700111 HCHG RX REV CODE 636 W/ 250 OVERRIDE (IP): Performed by: SPECIALIST

## 2022-04-22 PROCEDURE — 700102 HCHG RX REV CODE 250 W/ 637 OVERRIDE(OP): Performed by: SPECIALIST

## 2022-04-22 RX ORDER — GABAPENTIN 400 MG/1
400 CAPSULE ORAL 3 TIMES DAILY
Status: DISCONTINUED | OUTPATIENT
Start: 2022-04-22 | End: 2022-04-25 | Stop reason: HOSPADM

## 2022-04-22 RX ADMIN — GABAPENTIN 400 MG: 400 CAPSULE ORAL at 11:40

## 2022-04-22 RX ADMIN — ENOXAPARIN SODIUM 30 MG: 30 INJECTION SUBCUTANEOUS at 18:16

## 2022-04-22 RX ADMIN — ZOLPIDEM TARTRATE 10 MG: 5 TABLET ORAL at 02:29

## 2022-04-22 RX ADMIN — OXYCODONE HYDROCHLORIDE 10 MG: 10 TABLET ORAL at 11:41

## 2022-04-22 RX ADMIN — OXYCODONE HYDROCHLORIDE 10 MG: 10 TABLET ORAL at 02:29

## 2022-04-22 RX ADMIN — GABAPENTIN 200 MG: 100 CAPSULE ORAL at 05:33

## 2022-04-22 RX ADMIN — LIDOCAINE 3 PATCH: 50 PATCH TOPICAL at 08:46

## 2022-04-22 RX ADMIN — METAXALONE 800 MG: 800 TABLET ORAL at 11:41

## 2022-04-22 RX ADMIN — SENNOSIDES AND DOCUSATE SODIUM 1 TABLET: 50; 8.6 TABLET ORAL at 21:41

## 2022-04-22 RX ADMIN — LORAZEPAM 0.5 MG: 0.5 TABLET ORAL at 00:23

## 2022-04-22 RX ADMIN — PREDNISONE 40 MG: 20 TABLET ORAL at 05:31

## 2022-04-22 RX ADMIN — DOCUSATE SODIUM 100 MG: 100 CAPSULE, LIQUID FILLED ORAL at 05:33

## 2022-04-22 RX ADMIN — MAGNESIUM HYDROXIDE 30 ML: 400 SUSPENSION ORAL at 05:33

## 2022-04-22 RX ADMIN — POLYETHYLENE GLYCOL 3350 1 PACKET: 17 POWDER, FOR SOLUTION ORAL at 17:21

## 2022-04-22 RX ADMIN — GABAPENTIN 400 MG: 400 CAPSULE ORAL at 17:21

## 2022-04-22 RX ADMIN — METAXALONE 800 MG: 800 TABLET ORAL at 05:32

## 2022-04-22 RX ADMIN — POLYETHYLENE GLYCOL 3350 1 PACKET: 17 POWDER, FOR SOLUTION ORAL at 05:33

## 2022-04-22 RX ADMIN — OXYCODONE 5 MG: 5 TABLET ORAL at 14:50

## 2022-04-22 RX ADMIN — OXYCODONE HYDROCHLORIDE 10 MG: 10 TABLET ORAL at 18:16

## 2022-04-22 RX ADMIN — OXYCODONE HYDROCHLORIDE 10 MG: 10 TABLET ORAL at 08:37

## 2022-04-22 RX ADMIN — ENOXAPARIN SODIUM 30 MG: 30 INJECTION SUBCUTANEOUS at 05:37

## 2022-04-22 RX ADMIN — LORAZEPAM 0.5 MG: 0.5 TABLET ORAL at 10:35

## 2022-04-22 RX ADMIN — DOCUSATE SODIUM 100 MG: 100 CAPSULE, LIQUID FILLED ORAL at 17:21

## 2022-04-22 RX ADMIN — SENNOSIDES AND DOCUSATE SODIUM 1 TABLET: 50; 8.6 TABLET ORAL at 14:50

## 2022-04-22 RX ADMIN — OXYCODONE HYDROCHLORIDE 10 MG: 10 TABLET ORAL at 21:40

## 2022-04-22 RX ADMIN — OXYCODONE HYDROCHLORIDE 10 MG: 10 TABLET ORAL at 05:30

## 2022-04-22 RX ADMIN — METAXALONE 800 MG: 800 TABLET ORAL at 17:21

## 2022-04-22 RX ADMIN — ZOLPIDEM TARTRATE 10 MG: 5 TABLET ORAL at 21:40

## 2022-04-22 ASSESSMENT — PAIN DESCRIPTION - PAIN TYPE
TYPE: ACUTE PAIN
TYPE: ACUTE PAIN;SURGICAL PAIN
TYPE: ACUTE PAIN
TYPE: ACUTE PAIN;SURGICAL PAIN
TYPE: ACUTE PAIN;SURGICAL PAIN

## 2022-04-22 ASSESSMENT — COGNITIVE AND FUNCTIONAL STATUS - GENERAL
CLIMB 3 TO 5 STEPS WITH RAILING: A LOT
MOVING TO AND FROM BED TO CHAIR: A LOT
SUGGESTED CMS G CODE MODIFIER DAILY ACTIVITY: CK
HELP NEEDED FOR BATHING: A LITTLE
SUGGESTED CMS G CODE MODIFIER MOBILITY: CL
WALKING IN HOSPITAL ROOM: A LITTLE
MOBILITY SCORE: 14
STANDING UP FROM CHAIR USING ARMS: A LITTLE
DAILY ACTIVITIY SCORE: 19
MOVING FROM LYING ON BACK TO SITTING ON SIDE OF FLAT BED: A LOT
DRESSING REGULAR UPPER BODY CLOTHING: A LITTLE
TURNING FROM BACK TO SIDE WHILE IN FLAT BAD: A LOT
TOILETING: A LITTLE
DRESSING REGULAR LOWER BODY CLOTHING: A LOT

## 2022-04-22 ASSESSMENT — PATIENT HEALTH QUESTIONNAIRE - PHQ9
SUM OF ALL RESPONSES TO PHQ9 QUESTIONS 1 AND 2: 0
2. FEELING DOWN, DEPRESSED, IRRITABLE, OR HOPELESS: NOT AT ALL
1. LITTLE INTEREST OR PLEASURE IN DOING THINGS: NOT AT ALL

## 2022-04-22 ASSESSMENT — ENCOUNTER SYMPTOMS
VOMITING: 0
ROS GI COMMENTS: LAST BM 4/20
BLURRED VISION: 0
CHILLS: 0
MYALGIAS: 1
DOUBLE VISION: 0
NECK PAIN: 1
WEAKNESS: 0
NAUSEA: 0
HEADACHES: 0
FEVER: 0
ABDOMINAL PAIN: 0
TINGLING: 0
BACK PAIN: 1

## 2022-04-22 ASSESSMENT — GAIT ASSESSMENTS
DEVIATION: ANTALGIC;BRADYKINETIC;DECREASED HEEL STRIKE;DECREASED TOE OFF
DISTANCE (FEET): 15
ASSISTIVE DEVICE: FRONT WHEEL WALKER
GAIT LEVEL OF ASSIST: CONTACT GUARD ASSIST

## 2022-04-22 ASSESSMENT — ACTIVITIES OF DAILY LIVING (ADL): TOILETING: INDEPENDENT

## 2022-04-22 NOTE — DISCHARGE PLANNING
Met with patient at bedside, discussed plan for discharge home with HH PT  when stable, Choices given for HH and DME for FWW. Patient requesting [facility] which will probably not be approved by [employer] as he is not room confined and is able to get to bathroom with FWW. Lives with roommate in single [place] home with 1-2 steps outside, roommate or uncle might be able to drive him home but may need a taxi voucher if nobody available. Needs CT to determine if any further surgery needed.   RN to have FWW delivered to patients room.

## 2022-04-22 NOTE — FACE TO FACE
Face to Face Note  -  Durable Medical Equipment    Joann Galdamez D.N.P. - NPI: [id number]  I certify that this patient is under my care and that they have had a durable medical equipment(DME)face to face encounter by myself that meets the physician DME face-to-face encounter requirements with this patient on:    Date of encounter:   Patient:                    MRN:                       YOB: 2020  Estiven Hilsl  [id number]  1980     The encounter with the patient was in whole, or in part, for the following medical condition, which is the primary reason for durable medical equipment:  Other - Trauma- right femur fracture    I certify that, based on my findings, the following durable medical equipment is medically necessary:  Walkers.      My Clinical findings support the need for the above equipment due to:  Abnormal Gait    Supporting Symptoms: Physical therapy recommendations.     ------------------------------------------------------------------------------------------------------------------    Face to Face Supporting Documentation - Home Health    The encounter with this patient was in whole or in part the primary reason for home health admission.    Date of encounter:   Patient:                    MRN:                       YOB: 2020  Estiven Hills  [id number]  1980     Home health to see patient for:  Physical Therapy evaluation and treatment and Occupational therapy evaluation and treatment    Skilled need for:  New Onset Medical Diagnosis Trauma right femur fracture    Skilled nursing interventions to include:  Comment: physical therapy    Homebound evidenced status by:  Need the aid of supportive devices such as crutches, canes, wheelchairs or walkers. Leaving home must require a considerable and taxing effort. There must exist a normal inability to leave the home.    Community Physician to provide follow up care: Lisa Hitchcock M.D.      Optional Interventions    Wound information & treatment:    Home Infusion Therapy orders:    Line/Drain/Airway:    I certify the face to face encounter for this home care referral meets the CMS requirements and the encounter/clinical assessment with the patient was, in whole, or in part, for the medical condition(s) listed above, which is the primary reason for home health care. Based on my clinical findings: the service(s) are medically necessary, support the need for home health care, and the homebound criteria are met.  I certify that this patient has had a face to face encounter by myself.  MAIK YenP. - NPI: [id number]    *Debility, frailty and advanced age in the absence of an acute deterioration or exacerbation of a condition do not qualify a patient for home health.

## 2022-04-22 NOTE — CARE PLAN
Problem: Pain - Standard  Goal: Alleviation of pain or a reduction in pain to the patient’s comfort goal  Outcome: Progressing     Problem: Knowledge Deficit - Standard  Goal: Patient and family/care givers will demonstrate understanding of plan of care, disease process/condition, diagnostic tests and medications  Outcome: Progressing       The patient is Stable - Low risk of patient condition declining or worsening    Shift Goals  Clinical Goals: Pain management  Patient Goals: Pain management  Family Goals:     Progress made toward(s) clinical / shift goals:  Taught pt 0-10 pain scale and non-pharmacological method of pain management, encouraged to verbalize when in pain. Administered PRN pain meds as needed.      Patient is not progressing towards the following goals:
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
## 2022-04-22 NOTE — CARE PLAN
The patient is Stable - Low risk of patient condition declining or worsening    Shift Goals  Clinical Goals: mobility  Patient Goals: mobility; bowel movement  Family Goals: n/a    Progress made toward(s) clinical / shift goals:  Patient motivated for mobility.  Patient pain well controlled with scheduled and PRN medications. Patient calls appropriately.    Problem: Pain - Standard  Goal: Alleviation of pain or a reduction in pain to the patient’s comfort goal  Outcome: Progressing     Problem: Knowledge Deficit - Standard  Goal: Patient and family/care givers will demonstrate understanding of plan of care, disease process/condition, diagnostic tests and medications  Outcome: Progressing       Patient is not progressing towards the following goals:
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
## 2022-04-22 NOTE — PROGRESS NOTES
"Spine Surgery Progress Note    41 y.o. male with left C7-T1 facet fracture. TP Fractures at L2, L3 and L5 Appears stable on upright radiographs    S: Doing well overnight. GERSON.  Increased left elbow pain, weakness in his left arm, increased numbness in the left ulnar distribution. Can increases symptoms when he reaches for things with left arm.    O:  /83   Pulse 96   Temp 36.3 °C (97.3 °F) (Temporal)   Resp 16   Ht 1.778 m (5' 10\")   Wt 122 kg (270 lb)   SpO2 95%   BMI 38.74 kg/m²     Gen: WNWD NAD  Breathing comfortably    Dressing CDI  Cervical    Deltoid  Bi  Tri  WE  Flex  Finger Flexion  Right      5  5   5   5     5   5  Left      5  5   4   4    5   5    SILT C5-T1 BUE except: Decreased sensation left C8         Lab Results   Component Value Date/Time    WBC 10.5 04/22/2022 02:15 AM    RBC 3.39 (L) 04/22/2022 02:15 AM    HEMOGLOBIN 12.1 (L) 04/22/2022 02:15 AM    HEMATOCRIT 36.5 (L) 04/22/2022 02:15 AM    .7 (H) 04/22/2022 02:15 AM    MCH 35.7 (H) 04/22/2022 02:15 AM    MCHC 33.2 (L) 04/22/2022 02:15 AM    MPV 10.1 04/22/2022 02:15 AM    NEUTSPOLYS 73.90 (H) 04/22/2022 02:15 AM    LYMPHOCYTES 11.00 (L) 04/22/2022 02:15 AM    MONOCYTES 13.10 04/22/2022 02:15 AM    EOSINOPHILS 0.60 04/22/2022 02:15 AM    BASOPHILS 0.40 04/22/2022 02:15 AM    ANISOCYTOSIS 1+ 05/28/2016 02:25 AM      Lab Results   Component Value Date/Time    SODIUM 135 04/22/2022 02:15 AM    POTASSIUM 3.9 04/22/2022 02:15 AM    CHLORIDE 100 04/22/2022 02:15 AM    CO2 26 04/22/2022 02:15 AM    GLUCOSE 106 (H) 04/22/2022 02:15 AM    BUN 21 04/22/2022 02:15 AM    CREATININE 0.75 04/22/2022 02:15 AM          AP: 41 y.o. male with left C7-T1 facet fracture.  Multiple TP fractures in the lumbar spine.  He has no weakness in his left arm with increasing pain and numbness.  He is on prednisone taper which we will continue.  I have increase his gabapentin.  We will get updated CT scan of his cervical spine.  He will be n.p.o. at " midnight tonight, if he continues with weakness tomorrow Doctor Marie will discuss with him possibly fixing this fracture.     -Cervical collar full-time  -Patient may benefit from getting a shower collar -Ola collar usually works well for this  -No repeat bending twisting or lifting over 10 pounds  - CT C spine now, NPO at midnight, if left arm weakness persists, he may go to the OR tomorrow, Dr. Salazar to review Saturday morning  - Continue prednisone taper, increase gabapentin for pain control  -Patient will schedule an appointment 1 week from today for follow-up  -Office contact information provided to patient

## 2022-04-22 NOTE — DISCHARGE PLANNING
TOBIN Barros met with patient bedside and introduced Community Care Management and completed SDOH. Patient rents a room with a friend. Patient expresses the need to find a new location as patient is concerned getting around as roommate has a lot of things stored in the home. Patient is an active  and states he will have Zero transportation for a couple of weeks. Patient has plans to buy a car from a friend. Patient is not currently employed but is actively looking prior to admission . Patient has no income and expresses the need for food. Patient is current with PCP Lisa Hitchcock and has a scheduled appointment 05/04/22 @ 1:45pm. Patient is confident in managing his health and medications. Patient utilizes [facility].   Community Health Worker Intake  • Social determinates of health intake : Completed   • Identified barriers to Food, Transportation, Income  • Contact information provided to [place] : Yes   • Has PCP appointment scheduled for: 05/04/22 @ 1:45 pm  • Scheduled Food Delivery/Home Visit/Outpatient Visit: No  • Accepted/Declined Meds-To-Beds. Yes  • Inpatient assessment completed.    Plan:  CHPAULINE Barros discussed and provided all resources for [facility], 2 Bus passes, [employer] ( Employment), [facility] (for medical equipment and help with prescriptions as they offer $100 per calendar year). [facility] ( for additional help with medications),[facility] ( in home care Dr visit), [facility] ( affordable housing) and all CHW contact information . CHW will follow post discharge.

## 2022-04-22 NOTE — DISCHARGE PLANNING
Received Choice form at 1413  Agency/Facility Name: [facility]  Referral not sent per Choice form, pending DME order.    Received Choice form at 1413  Agency/Facility Name: [facility]  Referral not sent per Choice form, pending HH order.      Addendum @ 1436:  Received Choice form at 1413  Agency/Facility Name: [facility]  Referral sent per Choice form @ 1436    Received Choice form at 1413  Agency/Facility Name: [facility]  Referral sent per Choice form @ 1438     NOHEMI adams.

## 2022-04-22 NOTE — PROGRESS NOTES
TOBIN Barros met with patient bedside and introduced Community Care Management and completed SDOH. Patient rents a room with a friend. Patient expresses the need to find a new location as patient is concerned getting around as roommate has a lot of things stored in the home. Patient is an active  and states he will have Zero transportation for a couple of weeks. Patient has plans to buy a car from a friend. Patient is not currently employed but is actively looking prior to admission . Patient has no income and expresses the need for food. Patient is current with PCP Lisa Hitchcock and has a scheduled appointment 05/04/22 @ 1:45pm. Patient is confident in managing his health and medications. Patient utilizes [facility].   Community Health Worker Intake  • Social determinates of health intake : Completed   • Identified barriers to Food, Transportation, Income  • Contact information provided to [place] : Yes   • Has PCP appointment scheduled for: 05/04/22 @ 1:45 pm  • Scheduled Food Delivery/Home Visit/Outpatient Visit: No  • Accepted/Declined Meds-To-Beds. Yes  • Inpatient assessment completed.    Plan:  CHPAULINE Barros discussed and provided all resources for [facility] [employer] [facility], 2 Bus passes, [employer] ( Employment), [facility] (for medical equipment and help with prescriptions as they offer $100 per calendar year). [facility]eds[facility] ( for additional help with medications),[facility] ( in home care Dr visit), [facility] ( affordable housing) and all CHW contact information . CHW will follow post discharge.

## 2022-04-23 ENCOUNTER — APPOINTMENT (OUTPATIENT)
Dept: RADIOLOGY | Facility: MEDICAL CENTER | Age: 42
DRG: 551 | End: 2022-04-23
Attending: SPECIALIST
Payer: OTHER MISCELLANEOUS

## 2022-04-23 PROCEDURE — 700101 HCHG RX REV CODE 250

## 2022-04-23 PROCEDURE — 700102 HCHG RX REV CODE 250 W/ 637 OVERRIDE(OP): Performed by: PHYSICIAN ASSISTANT

## 2022-04-23 PROCEDURE — 700102 HCHG RX REV CODE 250 W/ 637 OVERRIDE(OP): Performed by: SPECIALIST

## 2022-04-23 PROCEDURE — A9270 NON-COVERED ITEM OR SERVICE: HCPCS | Performed by: PHYSICIAN ASSISTANT

## 2022-04-23 PROCEDURE — A9270 NON-COVERED ITEM OR SERVICE: HCPCS

## 2022-04-23 PROCEDURE — 71045 X-RAY EXAM CHEST 1 VIEW: CPT

## 2022-04-23 PROCEDURE — 700111 HCHG RX REV CODE 636 W/ 250 OVERRIDE (IP): Performed by: NEUROLOGICAL SURGERY

## 2022-04-23 PROCEDURE — 99232 SBSQ HOSP IP/OBS MODERATE 35: CPT | Mod: FS

## 2022-04-23 PROCEDURE — 700111 HCHG RX REV CODE 636 W/ 250 OVERRIDE (IP): Performed by: SPECIALIST

## 2022-04-23 PROCEDURE — 770001 HCHG ROOM/CARE - MED/SURG/GYN PRIV*

## 2022-04-23 PROCEDURE — A9270 NON-COVERED ITEM OR SERVICE: HCPCS | Performed by: SPECIALIST

## 2022-04-23 PROCEDURE — 700111 HCHG RX REV CODE 636 W/ 250 OVERRIDE (IP)

## 2022-04-23 PROCEDURE — 700102 HCHG RX REV CODE 250 W/ 637 OVERRIDE(OP)

## 2022-04-23 RX ADMIN — METAXALONE 800 MG: 800 TABLET ORAL at 05:42

## 2022-04-23 RX ADMIN — PREDNISONE 30 MG: 20 TABLET ORAL at 05:43

## 2022-04-23 RX ADMIN — OXYCODONE HYDROCHLORIDE 10 MG: 10 TABLET ORAL at 20:29

## 2022-04-23 RX ADMIN — METAXALONE 800 MG: 800 TABLET ORAL at 12:02

## 2022-04-23 RX ADMIN — LORAZEPAM 0.5 MG: 0.5 TABLET ORAL at 05:52

## 2022-04-23 RX ADMIN — DOCUSATE SODIUM 100 MG: 100 CAPSULE, LIQUID FILLED ORAL at 16:33

## 2022-04-23 RX ADMIN — LIDOCAINE 1 PATCH: 50 PATCH TOPICAL at 18:04

## 2022-04-23 RX ADMIN — OXYCODONE 5 MG: 5 TABLET ORAL at 12:02

## 2022-04-23 RX ADMIN — ENOXAPARIN SODIUM 30 MG: 30 INJECTION SUBCUTANEOUS at 05:44

## 2022-04-23 RX ADMIN — OXYCODONE HYDROCHLORIDE 10 MG: 10 TABLET ORAL at 05:44

## 2022-04-23 RX ADMIN — OXYCODONE HYDROCHLORIDE 10 MG: 10 TABLET ORAL at 23:44

## 2022-04-23 RX ADMIN — GABAPENTIN 400 MG: 400 CAPSULE ORAL at 12:02

## 2022-04-23 RX ADMIN — SENNOSIDES AND DOCUSATE SODIUM 1 TABLET: 50; 8.6 TABLET ORAL at 20:29

## 2022-04-23 RX ADMIN — ENOXAPARIN SODIUM 30 MG: 30 INJECTION SUBCUTANEOUS at 16:32

## 2022-04-23 RX ADMIN — GABAPENTIN 400 MG: 400 CAPSULE ORAL at 05:42

## 2022-04-23 RX ADMIN — OXYCODONE HYDROCHLORIDE 10 MG: 10 TABLET ORAL at 01:19

## 2022-04-23 RX ADMIN — LIDOCAINE 3 PATCH: 50 PATCH TOPICAL at 14:26

## 2022-04-23 RX ADMIN — MAGNESIUM HYDROXIDE 30 ML: 400 SUSPENSION ORAL at 05:42

## 2022-04-23 RX ADMIN — OXYCODONE HYDROCHLORIDE 10 MG: 10 TABLET ORAL at 08:51

## 2022-04-23 RX ADMIN — METAXALONE 800 MG: 800 TABLET ORAL at 18:06

## 2022-04-23 RX ADMIN — POLYETHYLENE GLYCOL 3350 1 PACKET: 17 POWDER, FOR SOLUTION ORAL at 18:04

## 2022-04-23 RX ADMIN — POLYETHYLENE GLYCOL 3350 1 PACKET: 17 POWDER, FOR SOLUTION ORAL at 05:44

## 2022-04-23 RX ADMIN — PREDNISONE 30 MG: 20 TABLET ORAL at 12:02

## 2022-04-23 RX ADMIN — GABAPENTIN 400 MG: 400 CAPSULE ORAL at 18:05

## 2022-04-23 RX ADMIN — OXYCODONE HYDROCHLORIDE 10 MG: 10 TABLET ORAL at 16:33

## 2022-04-23 RX ADMIN — DOCUSATE SODIUM 100 MG: 100 CAPSULE, LIQUID FILLED ORAL at 05:43

## 2022-04-23 ASSESSMENT — ENCOUNTER SYMPTOMS
CHILLS: 0
ROS GI COMMENTS: LAST BM 4/22
FEVER: 0
TINGLING: 0
DOUBLE VISION: 0
MYALGIAS: 1
VOMITING: 0
ABDOMINAL PAIN: 0
NECK PAIN: 1
HEADACHES: 0
BACK PAIN: 1
WEAKNESS: 0
NAUSEA: 0
BLURRED VISION: 0
SHORTNESS OF BREATH: 0

## 2022-04-23 ASSESSMENT — PAIN SCALES - PAIN ASSESSMENT IN ADVANCED DEMENTIA (PAINAD)
BREATHING: NORMAL
TOTALSCORE: 0
BODYLANGUAGE: RELAXED
BREATHING: NORMAL
CONSOLABILITY: NO NEED TO CONSOLE
FACIALEXPRESSION: SMILING OR INEXPRESSIVE
CONSOLABILITY: NO NEED TO CONSOLE
TOTALSCORE: 0
FACIALEXPRESSION: SMILING OR INEXPRESSIVE
BODYLANGUAGE: RELAXED

## 2022-04-23 ASSESSMENT — PAIN DESCRIPTION - PAIN TYPE
TYPE: ACUTE PAIN

## 2022-04-23 NOTE — DISCHARGE PLANNING
"    Date: 4/23/2022        Anticipated Discharge Disposition: DC home with needed DME, and home care      Action: CM met with patient , at bedside. Role of  explained, questions answered. patient is unemployed, and has \"no money\" for food.  /community healthworker provided:    CHW Fiorella discussed and provided all resources for [facility] [employer] [facility], 2 Bus passes, [employer] ( Employment), [facility] (for medical equipment and help with prescriptions as they offer $100 per calendar year). [facility] ( for additional help with medications),[employer] ( in home care Dr visit), [employer] ( affordable housing) and all CHW contact information:    [facility] referral placed for home care and patient is agreeable.  New choice form completed, and faxed to [facility].  Insurance IS NOT run on the weekend.  Home care pending acceptance, and that can only be confirmed on Monday, April 25, 2022    Barriers to Discharge:     1) Food pantry is not open until Monday; patient will need a method of getting \"to pantry\" for food.    2) patient states he needs a commode; Medicare will only cover walker or a commode, and walker was provided  3) home care has NOT been accepted, as of 4/23/2022      Plan:  CM will follow up on Monday, 4/25/2022, for home care, commode, and clarify food pantry plan.       Addendum:  CM requested [facility] to make blanket home care referral due to limited community resources., and serial denials.      Alyson Milton, RN, BSN, CM  Case Management  \"[facility]\"  E-mail: [email or web address]  Phone: [phone]    " Yes

## 2022-04-23 NOTE — DISCHARGE PLANNING
Agency/Facility Name: [facility]  Spoke To: Kelly  Outcome: Per Kelly, the on call nurse will follow up with the DPA.    Received Choice form at 7596  Agency/Facility Name: [facility]   Referral sent per Choice form @ 6250     @2418  Per RN CM, [facility] faxed HH referrals to [facility], [facility], [facility], and [facility].

## 2022-04-23 NOTE — PROGRESS NOTES
Report received at bedside, pt care assumed. Pt aaox4, no signs of distress noted at this time. Patient resting comfortably in bed. POC discussed with pt and verbalizes no questions. Pt c/o of 6/10 left shoulder, 5/10 hip hematoma, 6/10 elbow, 3/10 gout pain. Pt was not due for pain medication and  denies any additional needs at this time. Bed in lowest position, pt educated on fall risk and verbalized understanding, call light within reach.

## 2022-04-23 NOTE — CARE PLAN
The patient is Stable - Low risk of patient condition declining or worsening    Shift Goals  Clinical Goals: Pain Management and Sleep  Patient Goals: Pain management and sleep  Family Goals: n/a    Progress made toward(s) clinical / shift goals:    Problem: Pain - Standard  Goal: Alleviation of pain or a reduction in pain to the patient’s comfort goal  Outcome: Progressing     Problem: Knowledge Deficit - Standard  Goal: Patient and family/care givers will demonstrate understanding of plan of care, disease process/condition, diagnostic tests and medications  Outcome: Progressing       Patient is not progressing towards the following goals:

## 2022-04-23 NOTE — PROGRESS NOTES
"Spine Surgery Progress Note    41 y.o. male with left C7-T1 facet fracture. TP Fractures at L2, L3 and L5 Appears stable on upright radiographs    S: Doing well overnight. GERSON.  Continues with left elbow pain, weakness in his left arm, increased numbness in the left ulnar distribution. Can increases symptoms when he reaches for things with left arm. No changes per patient    Dr. Salazar assessed patient last night. He wants to hold on any surgical intervention at this time     O:  /100   Pulse 95   Temp 36.3 °C (97.3 °F) (Temporal)   Resp 17   Ht 1.778 m (5' 10\")   Wt 122 kg (270 lb)   SpO2 98%   BMI 38.74 kg/m²     Gen: WNWD NAD  Breathing comfortably    Dressing CDI  Cervical    Deltoid  Bi  Tri  WE  Flex  Finger Flexion  Right      5  5   5   5     5   5  Left      5  5   4   4    5   5    SILT C5-T1 BUE except: Decreased sensation left C8         Lab Results   Component Value Date/Time    WBC 10.5 04/22/2022 02:15 AM    RBC 3.39 (L) 04/22/2022 02:15 AM    HEMOGLOBIN 12.1 (L) 04/22/2022 02:15 AM    HEMATOCRIT 36.5 (L) 04/22/2022 02:15 AM    .7 (H) 04/22/2022 02:15 AM    MCH 35.7 (H) 04/22/2022 02:15 AM    MCHC 33.2 (L) 04/22/2022 02:15 AM    MPV 10.1 04/22/2022 02:15 AM    NEUTSPOLYS 73.90 (H) 04/22/2022 02:15 AM    LYMPHOCYTES 11.00 (L) 04/22/2022 02:15 AM    MONOCYTES 13.10 04/22/2022 02:15 AM    EOSINOPHILS 0.60 04/22/2022 02:15 AM    BASOPHILS 0.40 04/22/2022 02:15 AM    ANISOCYTOSIS 1+ 05/28/2016 02:25 AM      Lab Results   Component Value Date/Time    SODIUM 135 04/22/2022 02:15 AM    POTASSIUM 3.9 04/22/2022 02:15 AM    CHLORIDE 100 04/22/2022 02:15 AM    CO2 26 04/22/2022 02:15 AM    GLUCOSE 106 (H) 04/22/2022 02:15 AM    BUN 21 04/22/2022 02:15 AM    CREATININE 0.75 04/22/2022 02:15 AM          AP: 41 y.o. male with left C7-T1 facet fracture.  Multiple TP fractures in the lumbar spine.  He is on prednisone taper which     -Cervical collar full-time  -Rapides collar for " showers  -No repeat bending twisting or lifting over 10 pounds  -Patient will schedule an appointment 1 week from today for follow-up  -Office contact information provided to patient    OK for D/C from spine surgery standpoint. We will follow up with him as an outpatient.     - he is to call with any worsening symptoms including weakness.

## 2022-04-23 NOTE — PROGRESS NOTES
Reviewed the patient.  He develops new weakness this morning.  On specific questioning he feels that this is improved.  He has focal elbow pain.  He has some left C7 and possibly C8 numbness.  He has no pain down the arm.  He complains of shoulder pain and neck pain.  He looks very comfortable.    When I examined him there was 4/5 weakness in left finger extensors.  There is trace weakness in left elbow extension.  There is no evidence of myelopathy.  I reviewed all his imaging with him.  His repeat CT scan shows a hairline fracture on the facet joint on the right at C4-5.  He still has the facet fracture on the right at C6-7 with a small fragment in the foramen.    On the MRI scan he has moderate stenosis at C5-6 C6-7 and C7-T1.    I discussed various treatment options with him which include surgery tomorrow (posterior C5-T1 laminectomy and instrumented fusion) versus expectant observation.  He wants to give it more time which is not unreasonable.  I gave me back about surgery.  I discussed the risk benefits and alternatives.  He wants to hold on surgery.  I think this is not unreasonable but if he does not improve then surgery would certainly be on the table.    Plan    1.  No surgery tomorrow  2.  From a neurosurgical point of view he could be discharged tomorrow  3.  He will follow-up with Dr. Laws week  4.  Maben collar for showering.
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
## 2022-04-23 NOTE — PROGRESS NOTES
Submitted custom order for Bumpass brace.  To check the status of the order please call [phone].

## 2022-04-23 NOTE — CARE PLAN
The patient is Stable - Low risk of patient condition declining or worsening    Shift Goals  Clinical Goals: mobility; bowel movement; pain control  Patient Goals: pain management; mobility  Family Goals: n/a    Progress made toward(s) clinical / shift goals:  Patient pain is well controlled with scheduled and Prn medications.  Patient calls appropriately.  Patient resting in bed with belongings and call light within reach.  Bed is locked and in lowest position.   Problem: Pain - Standard  Goal: Alleviation of pain or a reduction in pain to the patient’s comfort goal  Outcome: Progressing     Problem: Knowledge Deficit - Standard  Goal: Patient and family/care givers will demonstrate understanding of plan of care, disease process/condition, diagnostic tests and medications  Outcome: Progressing       Patient is not progressing towards the following goals:

## 2022-04-24 PROCEDURE — A9270 NON-COVERED ITEM OR SERVICE: HCPCS | Performed by: SPECIALIST

## 2022-04-24 PROCEDURE — 770001 HCHG ROOM/CARE - MED/SURG/GYN PRIV*

## 2022-04-24 PROCEDURE — 700102 HCHG RX REV CODE 250 W/ 637 OVERRIDE(OP): Performed by: SPECIALIST

## 2022-04-24 PROCEDURE — 700102 HCHG RX REV CODE 250 W/ 637 OVERRIDE(OP): Performed by: PHYSICIAN ASSISTANT

## 2022-04-24 PROCEDURE — 700101 HCHG RX REV CODE 250

## 2022-04-24 PROCEDURE — 700102 HCHG RX REV CODE 250 W/ 637 OVERRIDE(OP)

## 2022-04-24 PROCEDURE — 700111 HCHG RX REV CODE 636 W/ 250 OVERRIDE (IP): Performed by: NEUROLOGICAL SURGERY

## 2022-04-24 PROCEDURE — A9270 NON-COVERED ITEM OR SERVICE: HCPCS

## 2022-04-24 PROCEDURE — A9270 NON-COVERED ITEM OR SERVICE: HCPCS | Performed by: PHYSICIAN ASSISTANT

## 2022-04-24 PROCEDURE — 700111 HCHG RX REV CODE 636 W/ 250 OVERRIDE (IP)

## 2022-04-24 PROCEDURE — 99232 SBSQ HOSP IP/OBS MODERATE 35: CPT | Mod: FS

## 2022-04-24 PROCEDURE — 700111 HCHG RX REV CODE 636 W/ 250 OVERRIDE (IP): Performed by: SPECIALIST

## 2022-04-24 RX ADMIN — OXYCODONE HYDROCHLORIDE 10 MG: 10 TABLET ORAL at 19:27

## 2022-04-24 RX ADMIN — LIDOCAINE 3 PATCH: 50 PATCH TOPICAL at 12:44

## 2022-04-24 RX ADMIN — SENNOSIDES AND DOCUSATE SODIUM 1 TABLET: 50; 8.6 TABLET ORAL at 22:32

## 2022-04-24 RX ADMIN — OXYCODONE HYDROCHLORIDE 10 MG: 10 TABLET ORAL at 16:01

## 2022-04-24 RX ADMIN — DOCUSATE SODIUM 100 MG: 100 CAPSULE, LIQUID FILLED ORAL at 06:11

## 2022-04-24 RX ADMIN — ENOXAPARIN SODIUM 30 MG: 30 INJECTION SUBCUTANEOUS at 06:11

## 2022-04-24 RX ADMIN — ENOXAPARIN SODIUM 30 MG: 30 INJECTION SUBCUTANEOUS at 16:02

## 2022-04-24 RX ADMIN — GABAPENTIN 400 MG: 400 CAPSULE ORAL at 16:02

## 2022-04-24 RX ADMIN — Medication 5 MG: at 22:32

## 2022-04-24 RX ADMIN — OXYCODONE HYDROCHLORIDE 10 MG: 10 TABLET ORAL at 06:12

## 2022-04-24 RX ADMIN — OXYCODONE HYDROCHLORIDE 10 MG: 10 TABLET ORAL at 22:33

## 2022-04-24 RX ADMIN — DOCUSATE SODIUM 100 MG: 100 CAPSULE, LIQUID FILLED ORAL at 16:02

## 2022-04-24 RX ADMIN — PREDNISONE 30 MG: 20 TABLET ORAL at 06:12

## 2022-04-24 RX ADMIN — ZOLPIDEM TARTRATE 10 MG: 5 TABLET ORAL at 00:39

## 2022-04-24 RX ADMIN — METAXALONE 800 MG: 800 TABLET ORAL at 06:11

## 2022-04-24 RX ADMIN — METAXALONE 800 MG: 800 TABLET ORAL at 12:44

## 2022-04-24 RX ADMIN — METAXALONE 800 MG: 800 TABLET ORAL at 16:02

## 2022-04-24 RX ADMIN — GABAPENTIN 400 MG: 400 CAPSULE ORAL at 06:12

## 2022-04-24 RX ADMIN — OXYCODONE HYDROCHLORIDE 10 MG: 10 TABLET ORAL at 02:48

## 2022-04-24 RX ADMIN — POLYETHYLENE GLYCOL 3350 1 PACKET: 17 POWDER, FOR SOLUTION ORAL at 16:02

## 2022-04-24 RX ADMIN — OXYCODONE HYDROCHLORIDE 10 MG: 10 TABLET ORAL at 09:43

## 2022-04-24 RX ADMIN — PREDNISONE 30 MG: 20 TABLET ORAL at 16:02

## 2022-04-24 RX ADMIN — GABAPENTIN 400 MG: 400 CAPSULE ORAL at 12:44

## 2022-04-24 RX ADMIN — OXYCODONE HYDROCHLORIDE 10 MG: 10 TABLET ORAL at 12:45

## 2022-04-24 ASSESSMENT — PAIN DESCRIPTION - PAIN TYPE
TYPE: ACUTE PAIN

## 2022-04-24 ASSESSMENT — ENCOUNTER SYMPTOMS
FEVER: 0
BLURRED VISION: 0
WEAKNESS: 0
CHILLS: 0
NAUSEA: 0
NECK PAIN: 1
ROS GI COMMENTS: LAST BM 4/23
MYALGIAS: 1
BACK PAIN: 1
DOUBLE VISION: 0
SHORTNESS OF BREATH: 0
VOMITING: 0
TINGLING: 0
ABDOMINAL PAIN: 0
HEADACHES: 0

## 2022-04-24 NOTE — CARE PLAN
Problem: Pain - Standard  Goal: Alleviation of pain or a reduction in pain to the patient’s comfort goal  Outcome: Progressing     Problem: Knowledge Deficit - Standard  Goal: Patient and family/care givers will demonstrate understanding of plan of care, disease process/condition, diagnostic tests and medications  Outcome: Progressing   The patient is Stable - Low risk of patient condition declining or worsening    Shift Goals  Clinical Goals: Pain management  Patient Goals: pain management, comfort  Family Goals: n/a    Progress made toward(s) clinical / shift goals:  Educate patient of available PRN pain medication per MAR. Reinforce fall/safety precautions.     Patient is not progressing towards the following goals:

## 2022-04-24 NOTE — PROGRESS NOTES
Patient doing well  Able to fully WB on fractured RLE no MRI needed   Follow up with Dr High in two weeks for repeat xrays

## 2022-04-24 NOTE — CARE PLAN
The patient is Stable - Low risk of patient condition declining or worsening    Shift Goals  Clinical Goals: Pain control, mobilize  Patient Goals: Pain ocntrol  Family Goals: n/a    Progress made toward(s) clinical / shift goals:    Problem: Pain - Standard  Goal: Alleviation of pain or a reduction in pain to the patient’s comfort goal  Outcome: Progressing  Note: Patient jacque to rate pain on a 0/10 scale. Pain being controlled with prn pain medication and rest.       Problem: Knowledge Deficit - Standard  Goal: Patient and family/care givers will demonstrate understanding of plan of care, disease process/condition, diagnostic tests and medications  Outcome: Progressing  Note: Patient able to communicate needs effectively. Plan of care updated to patient and on whiteboard. All questions answered at this time.        Patient is not progressing towards the following goals:
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
## 2022-04-24 NOTE — PROGRESS NOTES
"Spine Surgery Progress Note    41 y.o. male with left C7-T1 facet fracture. TP Fractures at L2, L3 and L5 Appears stable on upright radiographs    S: Doing well overnight. GERSON.  Continues with left elbow pain, weakness in his left arm, increased numbness in the left ulnar distribution. Can increases symptoms when he reaches for things with left arm. No changes per patient    Left Upper extremity symptoms unchanged. Mobilizing, voiding.     O:  /91   Pulse 98   Temp 36.2 °C (97.2 °F) (Temporal)   Resp 18   Ht 1.778 m (5' 10\")   Wt 122 kg (270 lb)   SpO2 96%   BMI 38.74 kg/m²     Gen: WNWD NAD  Breathing comfortably    Dressing CDI  Cervical    Deltoid  Bi  Tri  WE  Flex  Finger Flexion  Right      5  5   5   5     5   5  Left      5  5   4   4    5   5    SILT C5-T1 BUE except: Decreased sensation left C8         Lab Results   Component Value Date/Time    WBC 10.5 04/22/2022 02:15 AM    RBC 3.39 (L) 04/22/2022 02:15 AM    HEMOGLOBIN 12.1 (L) 04/22/2022 02:15 AM    HEMATOCRIT 36.5 (L) 04/22/2022 02:15 AM    .7 (H) 04/22/2022 02:15 AM    MCH 35.7 (H) 04/22/2022 02:15 AM    MCHC 33.2 (L) 04/22/2022 02:15 AM    MPV 10.1 04/22/2022 02:15 AM    NEUTSPOLYS 73.90 (H) 04/22/2022 02:15 AM    LYMPHOCYTES 11.00 (L) 04/22/2022 02:15 AM    MONOCYTES 13.10 04/22/2022 02:15 AM    EOSINOPHILS 0.60 04/22/2022 02:15 AM    BASOPHILS 0.40 04/22/2022 02:15 AM    ANISOCYTOSIS 1+ 05/28/2016 02:25 AM      Lab Results   Component Value Date/Time    SODIUM 135 04/22/2022 02:15 AM    POTASSIUM 3.9 04/22/2022 02:15 AM    CHLORIDE 100 04/22/2022 02:15 AM    CO2 26 04/22/2022 02:15 AM    GLUCOSE 106 (H) 04/22/2022 02:15 AM    BUN 21 04/22/2022 02:15 AM    CREATININE 0.75 04/22/2022 02:15 AM          AP: 41 y.o. male with left C7-T1 facet fracture.  Multiple TP fractures in the lumbar spine.  He is on prednisone taper which     -Cervical collar full-time  -[place] collar for showers  -No repeat bending twisting or lifting " over 10 pounds  -Patient will schedule an appointment 1 week from today for follow-up  -Office contact information provided to patient    OK for D/C from spine surgery standpoint. We will follow up with him as an outpatient.     - he is to call with any worsening symptoms including weakness.

## 2022-04-25 ENCOUNTER — APPOINTMENT (OUTPATIENT)
Dept: RADIOLOGY | Facility: MEDICAL CENTER | Age: 42
DRG: 551 | End: 2022-04-25
Payer: OTHER MISCELLANEOUS

## 2022-04-25 ENCOUNTER — PATIENT OUTREACH (OUTPATIENT)
Dept: HEALTH INFORMATION MANAGEMENT | Facility: OTHER | Age: 42
End: 2022-04-25
Payer: MEDICARE

## 2022-04-25 ENCOUNTER — PHARMACY VISIT (OUTPATIENT)
Dept: PHARMACY | Facility: MEDICAL CENTER | Age: 42
End: 2022-04-25
Payer: COMMERCIAL

## 2022-04-25 VITALS
SYSTOLIC BLOOD PRESSURE: 147 MMHG | BODY MASS INDEX: 38.65 KG/M2 | TEMPERATURE: 98.3 F | HEIGHT: 70 IN | RESPIRATION RATE: 17 BRPM | DIASTOLIC BLOOD PRESSURE: 94 MMHG | OXYGEN SATURATION: 97 % | HEART RATE: 87 BPM | WEIGHT: 270 LBS

## 2022-04-25 LAB
ANION GAP SERPL CALC-SCNC: 10 MMOL/L (ref 7–16)
BASOPHILS # BLD AUTO: 0.7 % (ref 0–1.8)
BASOPHILS # BLD: 0.08 K/UL (ref 0–0.12)
BUN SERPL-MCNC: 24 MG/DL (ref 8–22)
CALCIUM SERPL-MCNC: 9.3 MG/DL (ref 8.5–10.5)
CHLORIDE SERPL-SCNC: 101 MMOL/L (ref 96–112)
CO2 SERPL-SCNC: 26 MMOL/L (ref 20–33)
CREAT SERPL-MCNC: 0.51 MG/DL (ref 0.5–1.4)
EOSINOPHIL # BLD AUTO: 0.01 K/UL (ref 0–0.51)
EOSINOPHIL NFR BLD: 0.1 % (ref 0–6.9)
ERYTHROCYTE [DISTWIDTH] IN BLOOD BY AUTOMATED COUNT: 57.1 FL (ref 35.9–50)
GFR SERPLBLD CREATININE-BSD FMLA CKD-EPI: 132 ML/MIN/1.73 M 2
GLUCOSE SERPL-MCNC: 106 MG/DL (ref 65–99)
HCT VFR BLD AUTO: 35.8 % (ref 42–52)
HGB BLD-MCNC: 11.7 G/DL (ref 14–18)
IMM GRANULOCYTES # BLD AUTO: 0.42 K/UL (ref 0–0.11)
IMM GRANULOCYTES NFR BLD AUTO: 3.8 % (ref 0–0.9)
LYMPHOCYTES # BLD AUTO: 1.24 K/UL (ref 1–4.8)
LYMPHOCYTES NFR BLD: 11.2 % (ref 22–41)
MCH RBC QN AUTO: 35.2 PG (ref 27–33)
MCHC RBC AUTO-ENTMCNC: 32.7 G/DL (ref 33.7–35.3)
MCV RBC AUTO: 107.8 FL (ref 81.4–97.8)
MONOCYTES # BLD AUTO: 1.19 K/UL (ref 0–0.85)
MONOCYTES NFR BLD AUTO: 10.7 % (ref 0–13.4)
NEUTROPHILS # BLD AUTO: 8.17 K/UL (ref 1.82–7.42)
NEUTROPHILS NFR BLD: 73.5 % (ref 44–72)
NRBC # BLD AUTO: 0 K/UL
NRBC BLD-RTO: 0 /100 WBC
PLATELET # BLD AUTO: 245 K/UL (ref 164–446)
PMV BLD AUTO: 9.9 FL (ref 9–12.9)
POTASSIUM SERPL-SCNC: 4.7 MMOL/L (ref 3.6–5.5)
RBC # BLD AUTO: 3.32 M/UL (ref 4.7–6.1)
SODIUM SERPL-SCNC: 137 MMOL/L (ref 135–145)
WBC # BLD AUTO: 11.1 K/UL (ref 4.8–10.8)

## 2022-04-25 PROCEDURE — 80048 BASIC METABOLIC PNL TOTAL CA: CPT

## 2022-04-25 PROCEDURE — 700102 HCHG RX REV CODE 250 W/ 637 OVERRIDE(OP): Performed by: SPECIALIST

## 2022-04-25 PROCEDURE — 700102 HCHG RX REV CODE 250 W/ 637 OVERRIDE(OP)

## 2022-04-25 PROCEDURE — 700111 HCHG RX REV CODE 636 W/ 250 OVERRIDE (IP): Performed by: SPECIALIST

## 2022-04-25 PROCEDURE — 97116 GAIT TRAINING THERAPY: CPT

## 2022-04-25 PROCEDURE — 85025 COMPLETE CBC W/AUTO DIFF WBC: CPT

## 2022-04-25 PROCEDURE — 700101 HCHG RX REV CODE 250

## 2022-04-25 PROCEDURE — A9270 NON-COVERED ITEM OR SERVICE: HCPCS

## 2022-04-25 PROCEDURE — RXMED WILLOW AMBULATORY MEDICATION CHARGE

## 2022-04-25 PROCEDURE — 700102 HCHG RX REV CODE 250 W/ 637 OVERRIDE(OP): Performed by: PHYSICIAN ASSISTANT

## 2022-04-25 PROCEDURE — A9270 NON-COVERED ITEM OR SERVICE: HCPCS | Performed by: PHYSICIAN ASSISTANT

## 2022-04-25 PROCEDURE — L0172 CERV COL SR FOAM 2PC PRE OTS: HCPCS

## 2022-04-25 PROCEDURE — 97535 SELF CARE MNGMENT TRAINING: CPT

## 2022-04-25 PROCEDURE — 99239 HOSP IP/OBS DSCHRG MGMT >30: CPT | Mod: FS

## 2022-04-25 PROCEDURE — 71045 X-RAY EXAM CHEST 1 VIEW: CPT

## 2022-04-25 PROCEDURE — A9270 NON-COVERED ITEM OR SERVICE: HCPCS | Performed by: SPECIALIST

## 2022-04-25 PROCEDURE — 306637 HCHG MISC ORTHO ITEM RC 0274

## 2022-04-25 PROCEDURE — 700111 HCHG RX REV CODE 636 W/ 250 OVERRIDE (IP): Performed by: NEUROLOGICAL SURGERY

## 2022-04-25 RX ORDER — ACETAMINOPHEN 325 MG/1
650 TABLET ORAL EVERY 6 HOURS PRN
Qty: 30 TABLET | Refills: 0 | COMMUNITY
Start: 2022-04-25

## 2022-04-25 RX ORDER — PREDNISONE 20 MG/1
TABLET ORAL
Qty: 6 TABLET | Refills: 0 | Status: SHIPPED | OUTPATIENT
Start: 2022-04-25 | End: 2022-04-29

## 2022-04-25 RX ORDER — GABAPENTIN 400 MG/1
400 CAPSULE ORAL 3 TIMES DAILY
Qty: 21 CAPSULE | Refills: 0 | Status: SHIPPED | OUTPATIENT
Start: 2022-04-25 | End: 2022-05-02

## 2022-04-25 RX ORDER — METAXALONE 800 MG/1
800 TABLET ORAL 3 TIMES DAILY
Qty: 42 TABLET | Refills: 0 | Status: SHIPPED | OUTPATIENT
Start: 2022-04-25 | End: 2022-05-09

## 2022-04-25 RX ORDER — AMOXICILLIN 250 MG
1 CAPSULE ORAL
Qty: 30 TABLET | Refills: 0 | COMMUNITY
Start: 2022-04-25 | End: 2022-10-25

## 2022-04-25 RX ORDER — OXYCODONE HYDROCHLORIDE 5 MG/1
5-10 TABLET ORAL EVERY 4 HOURS PRN
Qty: 28 TABLET | Refills: 0 | Status: SHIPPED | OUTPATIENT
Start: 2022-04-25 | End: 2022-04-26 | Stop reason: SDUPTHER

## 2022-04-25 RX ADMIN — METAXALONE 800 MG: 800 TABLET ORAL at 04:27

## 2022-04-25 RX ADMIN — LIDOCAINE 3 PATCH: 50 PATCH TOPICAL at 15:20

## 2022-04-25 RX ADMIN — ENOXAPARIN SODIUM 30 MG: 30 INJECTION SUBCUTANEOUS at 04:27

## 2022-04-25 RX ADMIN — OXYCODONE HYDROCHLORIDE 10 MG: 10 TABLET ORAL at 11:56

## 2022-04-25 RX ADMIN — PREDNISONE 20 MG: 20 TABLET ORAL at 06:02

## 2022-04-25 RX ADMIN — OXYCODONE HYDROCHLORIDE 10 MG: 10 TABLET ORAL at 04:28

## 2022-04-25 RX ADMIN — GABAPENTIN 400 MG: 400 CAPSULE ORAL at 04:28

## 2022-04-25 RX ADMIN — OXYCODONE HYDROCHLORIDE 10 MG: 10 TABLET ORAL at 01:32

## 2022-04-25 RX ADMIN — ZOLPIDEM TARTRATE 10 MG: 5 TABLET ORAL at 01:33

## 2022-04-25 RX ADMIN — GABAPENTIN 400 MG: 400 CAPSULE ORAL at 11:17

## 2022-04-25 RX ADMIN — METAXALONE 800 MG: 800 TABLET ORAL at 11:17

## 2022-04-25 RX ADMIN — OXYCODONE HYDROCHLORIDE 10 MG: 10 TABLET ORAL at 15:18

## 2022-04-25 RX ADMIN — DOCUSATE SODIUM 100 MG: 100 CAPSULE, LIQUID FILLED ORAL at 04:28

## 2022-04-25 RX ADMIN — OXYCODONE HYDROCHLORIDE 10 MG: 10 TABLET ORAL at 08:43

## 2022-04-25 RX ADMIN — LORAZEPAM 0.5 MG: 0.5 TABLET ORAL at 11:39

## 2022-04-25 ASSESSMENT — GAIT ASSESSMENTS
DEVIATION: STEP TO;ANTALGIC
DISTANCE (FEET): 40
ASSISTIVE DEVICE: FRONT WHEEL WALKER
GAIT LEVEL OF ASSIST: SUPERVISED

## 2022-04-25 ASSESSMENT — COGNITIVE AND FUNCTIONAL STATUS - GENERAL
SUGGESTED CMS G CODE MODIFIER DAILY ACTIVITY: CH
WALKING IN HOSPITAL ROOM: A LITTLE
STANDING UP FROM CHAIR USING ARMS: A LITTLE
SUGGESTED CMS G CODE MODIFIER MOBILITY: CK
MOBILITY SCORE: 18
DAILY ACTIVITIY SCORE: 24
MOVING FROM LYING ON BACK TO SITTING ON SIDE OF FLAT BED: A LITTLE
MOVING TO AND FROM BED TO CHAIR: A LITTLE
CLIMB 3 TO 5 STEPS WITH RAILING: A LITTLE
TURNING FROM BACK TO SIDE WHILE IN FLAT BAD: A LITTLE

## 2022-04-25 ASSESSMENT — PAIN DESCRIPTION - PAIN TYPE: TYPE: ACUTE PAIN

## 2022-04-25 NOTE — PROGRESS NOTES
#6 - [facility], non trauma activation - T1 superior endplate and superior facet fracture, C7 left inferior facet fracture, fractures of the transverse processes of C3 and C4 and rib fractures.    Adequate pain control.  Tolerating diet.    Ambulating with walker.    A/Ox 4.  Respiratory rate even and unlabored.  Abdomen soft.  Last BM 4/25.  Patient in a c-collar.  [place] collar at bedside.  Bruising to the right hip improving.    Cleared for discharge.     Discussed prescriptions and follow up with patient.

## 2022-04-25 NOTE — DISCHARGE PLANNING
Meds-to-Beds: Discharge prescription orders listed below delivered to patient in discharge lounge. RN notified. Patient counseled. Patient elected to have payment billed to patient account.      Current Outpatient Medications   Medication Sig Dispense Refill   • predniSONE (DELTASONE) 20 MG Tab Take 1 Tablet by mouth 2 times a day for 2 days, THEN 0.5 Tablet 2 times a day for 2 days. 6 Tablet 0   • gabapentin (NEURONTIN) 400 MG Cap Take 1 Capsule by mouth 3 times a day for 7 days. 21 Capsule 0   • metaxalone (SKELAXIN) 800 MG Tab Take 1 Tablet by mouth 3 times a day for 14 days. 42 Tablet 0   • oxyCODONE immediate-release (ROXICODONE) 5 MG Tab Take 1-2 Tablets by mouth every four hours as needed for Severe Pain for up to 7 days. 28 Tablet 0      Jhoana Tracy, PharmD

## 2022-04-25 NOTE — THERAPY
"Occupational Therapy  Daily Treatment     Patient Name: Estiven Hills  Age:  41 y.o., Sex:  male  Medical Record #: [id number]  Today's Date: 4/25/2022       Precautions: Fall Risk,Cervical Collar  ,Spinal / Back Precautions   Comments: C-Collar on AAT, [place] collar to be worn for shower.    Assessment    Pt seen for OT tx.  Pt sitting up in chair on arrival.  Pt was very anxious, verbose & tearful throughout tx session.  Pt educated on how to don/doff C-collar & change out pads correctly.  Pt also able to don [place] C-collar for showers.  Pt taught how to get on/off toilet which he completed with supervision.  Pt very fearful his toilet is much lower at home.  Pt advised to obtain a RTS or BSC for home use as well as a shower chair.  Pt also very anxious about obtaining food as he has no vehicle or $ to purchase food.   Pt reported he has minimal support system upon D/C.    Pt plans to D/C home today.  Pt currently able to complete basic ADL's with supervision.  Pt has met all OT goals.  Pt has no further Acute OT needs.    Plan    Discharge secondary to goals met.    DC Equipment Recommendations: Tub / Shower Seat,Raised Toilet Seat with Arms  Discharge Recommendations: Recommend home health for continued occupational therapy services    Subjective    \"I don't have any money to buy groceries & no way to get to the store to buy them\"     Objective       04/25/22 1328   Cognition    Cognition / Consciousness X   Comments Pt is extremely anxious, verbose & tearful throughout tx session.  Pt is focused on his pain & current life circumstances that has him scared & fearful   Other Treatments   Other Treatments Provided Long discussion with pt on how to correctly don/doff C-collar, change out pads & to use [place] collar for showers.  Pt is aware to try to obtain a shower chair & raised toilet seat from [facility] but has limited resource to go there to  items.  Pt has a lot of concerns about " "his low bed at home as his mattress is on the floor.   Balance   Sitting Balance (Static) Good   Sitting Balance (Dynamic) Fair +   Standing Balance (Static) Fair +   Standing Balance (Dynamic) Fair   Weight Shift Sitting Good   Weight Shift Standing Good   Comments pt reports pain in his right hip & left elbow that limit his mobility   Activities of Daily Living   Eating Modified Independent   Grooming Supervision;Standing   Bathing Supervision   Upper Body Dressing Supervision   Lower Body Dressing Supervision   Toileting Supervision   Functional Mobility   Sit to Stand Supervised   Bed, Chair, Wheelchair Transfer Supervised   Toilet Transfers Supervised   Mobility pt able to amb to bathroom with FWW & supervision   Patient / Family Goals   Patient / Family Goal #1 \"I want as much therapy as possible\"   Goal #1 Outcome Progressing as expected   Short Term Goals   Short Term Goal # 1 pt will demo toileting ADL at SPV level   Goal Outcome # 1 Goal met   Short Term Goal # 2 pt will demo LB dress at SPV level   Goal Outcome # 2 Goal met     "

## 2022-04-25 NOTE — PROGRESS NOTES
"Spine Surgery Progress Note    41 y.o. male with left C7-T1 facet fracture. TP Fractures at L2, L3 and L5 Appears stable on upright radiographs    S: Doing well overnight. GERSON.  Continues with left elbow pain,    States that he feels the L elbow weakness is pain related. Numbness in the left ulnar distribution is gradually improving per patient.      O:  /81   Pulse 70   Temp 36.2 °C (97.2 °F) (Temporal)   Resp 15   Ht 1.778 m (5' 10\")   Wt 122 kg (270 lb)   SpO2 93%   BMI 38.74 kg/m²     Gen: WNWD NAD  Breathing comfortably    Dressing CDI  Cervical    Deltoid  Bi  Tri  WE  Flex  Finger Flexion  Right      5  5   5   5     5   5  Left      5  5   4+   4    5   5    SILT C5-T1 BUE except: Decreased sensation left C8         Lab Results   Component Value Date/Time    WBC 11.1 (H) 04/25/2022 02:56 AM    RBC 3.32 (L) 04/25/2022 02:56 AM    HEMOGLOBIN 11.7 (L) 04/25/2022 02:56 AM    HEMATOCRIT 35.8 (L) 04/25/2022 02:56 AM    .8 (H) 04/25/2022 02:56 AM    MCH 35.2 (H) 04/25/2022 02:56 AM    MCHC 32.7 (L) 04/25/2022 02:56 AM    MPV 9.9 04/25/2022 02:56 AM    NEUTSPOLYS 73.50 (H) 04/25/2022 02:56 AM    LYMPHOCYTES 11.20 (L) 04/25/2022 02:56 AM    MONOCYTES 10.70 04/25/2022 02:56 AM    EOSINOPHILS 0.10 04/25/2022 02:56 AM    BASOPHILS 0.70 04/25/2022 02:56 AM    ANISOCYTOSIS 1+ 05/28/2016 02:25 AM      Lab Results   Component Value Date/Time    SODIUM 137 04/25/2022 02:56 AM    POTASSIUM 4.7 04/25/2022 02:56 AM    CHLORIDE 101 04/25/2022 02:56 AM    CO2 26 04/25/2022 02:56 AM    GLUCOSE 106 (H) 04/25/2022 02:56 AM    BUN 24 (H) 04/25/2022 02:56 AM    CREATININE 0.51 04/25/2022 02:56 AM          AP: 41 y.o. male with left C7-T1 facet fracture.  Multiple TP fractures in the lumbar spine. I discussed pros and cons of surgery, patient would like to avoid at the moment. We'll see how he's doing in about a week. Patient counseled to contact office immediately if any new neurologic deficits    -Cervical collar " full-time  -[place] collar for showers  -No repeat bending twisting or lifting over 10 pounds  -Patient will schedule an appointment in 1 week for follow-up  -Office contact information provided to patient    OK for D/C from spine surgery standpoint. We will follow up with him as an outpatient.     - he is to call with any worsening symptoms including weakness.

## 2022-04-25 NOTE — THERAPY
Physical Therapy   Daily Treatment     Patient Name: Estiven Hills  Age:  41 y.o., Sex:  male  Medical Record #: [id number]  Today's Date: 4/25/2022     Precautions  Precautions: Cervical Collar  ;Spinal / Back Precautions   Comments: c-collar on AAT    Assessment    Pt seen for follow up PT tx in preparation to dc home. Pt ambulated 40ft 2x with FWW and SPV. With encouragement pt agreeable to attempt stairs which he reports are 2 platform steps. Pt able to complete with SPV using FWW and with proper sequencing. If pt remains in acute care setting PT will cont in order to address longer gait distances and bed mobility.     Plan    Continue current treatment plan.    DC Equipment Recommendations: Front-Wheel Walker  Discharge Recommendations: Recommend home health for continued physical therapy services (HHPT if possible)         04/25/22 1351   Cognition    Level of Consciousness Alert   Comments pt upset about social issues   Balance   Sitting Balance (Static) Good   Sitting Balance (Dynamic) Good   Standing Balance (Static) Fair +   Standing Balance (Dynamic) Fair   Weight Shift Sitting Good   Weight Shift Standing Good   Skilled Intervention Verbal Cuing   Comments FWW   Gait Analysis   Gait Level Of Assist Supervised   Assistive Device Front Wheel Walker   Distance (Feet) 40   # of Times Distance was Traveled 2   Deviation Step To;Antalgic   # of Stairs Climbed 1   Level of Assist with Stairs Supervised   Weight Bearing Status WBAT BUE, BLE   Skilled Intervention Verbal Cuing   Comments cues for stair sequencing   Bed Mobility    Comments in chair pre and post   Functional Mobility   Sit to Stand Supervised   Transfer Method Stand Step   Mobility in room and hallway with FWW   Skilled Intervention Verbal Cuing   Short Term Goals    Short Term Goal # 1 Pt will perform supine <> sit without bed features with SPV within 6 visits in order to progress toward PLOF   Goal Outcome # 1 goal not met   Short Term Goal #  2 Pt will demonstrate STS/functional transfers with FWW and SPV within 6 visits to progress OOB mobility   Goal Outcome # 2 Goal met   Short Term Goal # 3 Pt will ambulate 100 ft with FWW and SPV within 6 visits in order to progress toward PLOF   Goal Outcome # 3 Goal not met   Short Term Goal # 4 Pt will negotiate 2 steps with min A within 6 visits in order to access home   Goal Outcome # 4 Other (see comments)  (met at supervision level with platform step as per home set up provided by pt)   Anticipated Discharge Equipment and Recommendations   DC Equipment Recommendations Front-Wheel Walker   Discharge Recommendations Recommend home health for continued physical therapy services  (HHPT if possible)

## 2022-04-25 NOTE — DISCHARGE PLANNING
Patient accepted by [facility] NOLBERTO, ABHIJIT delivered to room. Plan discharge home today with  PT.

## 2022-04-25 NOTE — DISCHARGE INSTRUCTIONS
Discharge Instructions    Discharged to home by car with relative. Discharged via wheelchair, hospital escort: Yes.  Special equipment needed: Not Applicable    Be sure to schedule a follow-up appointment with your primary care doctor or any specialists as instructed.   Follow up with Dr. Lafleur neurosurgeon in 1 week  Follow up with Dr. 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 Unless I have been given specific instructions below for another diet, I accept this instruction as my diet prescription.   Other diet: Regular diet    Special Instructions: None    · Is patient discharged on Warfarin / Coumadin?   No       Lumbar Spine Fracture  A lumbar spine fracture is a break in one of the bones of the lower back. Lumbar spine fractures can vary from mild to severe. The most severe types are those that:  · Cause the broken bones to move out of place (unstable).  · Injure or press on the spinal cord.  During recovery, it is normal to have pain and stiffness in the lower back for weeks.  What are the causes?  This condition may be caused by:  · A fall.  · A car accident.  · A gunshot wound.  · A hard, direct hit to the back.  What increases the risk?  You are more likely to develop this condition if:  · You are in a situation that could result in a fall or other violent injury.  · You have a condition that causes weakness in the bones (osteoporosis).  What are the signs or symptoms?  The main symptom of this condition is severe pain in the lower back. If a fracture is complex or severe, there may also be:  · A misshapen or swollen area on the lower back.  · Limited ability to move an area of the lower back.  · Inability to  empty the bladder (urinary retention).  · Loss of bowel or bladder control (incontinence).  · Loss of strength or sensation in the legs, feet, and toes.  · Inability to move (paralysis).  How is this diagnosed?  This condition is diagnosed based on:  · A physical exam.  · Symptoms and what happened just before they developed.  · The results of imaging tests, such as an X-ray, CT scan, or MRI.  If your nerves have been damaged, you may also have other tests to find out the extent of the damage.  How is this treated?  Treatment for this condition depends on how severe the injury is. Most fractures can be treated with:  · A back brace.  · Bed rest and activity restrictions.  · Pain medicine.  · Physical therapy.  Fractures that are complex, involve multiple bones, or make the spine unstable may require surgery. Surgery is done:  · To remove pressure from the nerves or spinal cord.  · To stabilize the broken pieces of bone.  Follow these instructions at home:  Medicines  · Take over-the-counter and prescription medicines only as told by your health care provider.  · Do not drive or use heavy machinery while taking prescription pain medicine.  · If you are taking prescription pain medicine, take actions to prevent or treat constipation. Your health care provider may recommend that you:  ? Drink enough fluid to keep your urine pale yellow.  ? Eat foods that are high in fiber, such as fresh fruits and vegetables, whole grains, and beans.  ? Limit foods that are high in fat and processed sugars, such as fried or sweet foods.  ? Take an over-the-counter or prescription medicine for constipation.  If you have a brace:  · Wear the back brace as told by your health care provider. Remove it only as told by your health care provider.  · Keep the brace clean.  · If the brace is not waterproof:  ? Do not let it get wet.  ? Cover it with a watertight covering when you take a bath or a shower.  Activity  · Stay in bed (on bed rest)  only as directed by your health care provider.  · Do exercises to improve motion and strength in your back (physical therapy), if your health care provider tells you to do so.  · Return to your normal activities as directed by your health care provider. Ask your health care provider what activities are safe for you.  Managing pain, stiffness, and swelling    · If directed, put ice on the injured area:  ? If you have a removable brace, remove it as told by your health care provider.  ? Put ice in a plastic bag.  ? Place a towel between your skin and the bag.  ? Leave the ice on for 20 minutes, 2-3 times a day.  General instructions  · Do not use any products that contain nicotine or tobacco, such as cigarettes and e-cigarettes. These can delay healing after injury. If you need help quitting, ask your health care provider.  · Do not drink alcohol. Alcohol can interfere with your treatment.  · Keep all follow-up visits as directed by your health care provider. This is important.  ? Failing to follow up as recommended could result in permanent injury, disability, or long-lasting (chronic) pain.  Contact a health care provider if:  · You have a fever.  · Your pain medicine is not helping.  · Your pain does not get better over time.  · You cannot return to your normal activities as planned or expected.  Get help right away if:  · You have difficulty breathing.  · Your pain is very bad and it suddenly gets worse.  · You have numbness, tingling, or weakness in any part of your body.  · You are unable to empty your bladder.  · You cannot control your bladder or bowels.  · You are unable to move any body part (paralysis) that is below the level of your injury.  · You vomit.  · You have pain in your abdomen.  Summary  · A lumbar spine fracture is a break in one of the bones of the lower back.  · The main symptom of this condition is severe pain in the lower back. If a fracture is complex, there may also be numbness, tingling,  or paralysis in the legs.  · Treatment depends on how severe the injury is. Most fractures can be treated with a back brace, bed rest and activity restrictions, pain medicine, and physical therapy.  · Fractures that are complex, involve multiple bones, or make the spine unstable may require surgery.  This information is not intended to replace advice given to you by your health care provider. Make sure you discuss any questions you have with your health care provider.  Document Released: 04/03/2008 Document Revised: 02/02/2019 Document Reviewed: 02/02/2019  Elsevier Patient Education © 2020 "[employer] Inc[employer]    Femoral Shaft Fracture    A femoral shaft fracture is a break in the long, straight part (shaft) of the thigh bone (femur). This condition is almost always treated with surgery.  What are the causes?  This condition may be caused by a forceful impact, such as from:  · A fall, especially from a great height.  · A sports injury.  · A car or motorcycle accident.  What increases the risk?  This condition is more likely to develop in people who:  · Are older. The risk increases with age.  · Have certain medical conditions that cause bones to become weak and thin, such as osteoporosis.  · Take medicines for osteoporosis.  · Play high-risk or high-impact sports.  What are the signs or symptoms?  Symptoms of this condition include:  · Severe pain.  · Inability to walk.  · Bruising.  · Swelling.  · The thigh looking misshapen (deformity).  If the fracture broke the skin (open fracture), there may also be bleeding.  How is this diagnosed?  This condition is diagnosed based on:  · Your symptoms and medical history.  · A physical exam.  · X-rays.  How is this treated?  This condition is usually treated with one or more of the following:  · Surgery to fix the bone pieces into place with pins that are attached to a stabilizing bar outside your skin (external fixation).  · Surgery to insert a garland and screws into your femur  (intramedullary nailing). If you had external fixation, you may also need intramedullary nailing a few weeks later.  · Surgery to place metal plates on the femur to hold it in place. This may be done if your fracture is closer to an end of your femur.  In rare cases, surgery may not be an option. In that case, a cast or a splint will be placed on your leg to hold it in place while the bone heals (immobilization).  Treatment may also include:  · Not putting weight on your leg until it heals (weight-bearing restrictions).  · Using a device to help you move around (assistive device), such as crutches or a wheelchair.  · Physical therapy.  Follow these instructions at home:  If you have a cast:  · Do not stick anything inside the cast to scratch your skin. Doing that increases your risk of infection.  · Check the skin around the cast every day. Tell your health care provider about any concerns.  · You may put lotion on dry skin around the edges of the cast. Do not put lotion on the skin underneath the cast.  · Keep the cast clean.  · If the cast is not waterproof:  ? Do not let it get wet.  ? Cover it with a watertight covering when you take a bath or a shower.  If you have a splint:  · Wear the splint as told by your health care provider. Remove it only as told by your health care provider.  · Loosen the splint if your toes tingle, become numb, or turn cold and blue.  · Keep the splint clean.  · If you have a splint that is not waterproof:  ? Do not let it get wet.  ? Cover it with a watertight covering when you take a bath or a shower.  Activity  · Do not use your leg to support your body weight until your health care provider says that you can. Follow weight-bearing restrictions.  · Use crutches, a wheelchair, or other assistive devices as directed.  · Ask your health care provider what activities are safe for you during recovery, and what activities you need to avoid.  · Do physical therapy exercises as  directed.  Medicines  · Take over-the-counter and prescription medicines only as told by your health care provider.  · Ask your health care provider if you should take supplements of calcium and vitamins C and D to help your bone heal.  Managing pain, stiffness, and swelling    · If directed, put ice on painful areas:  ? If you have a removable splint, remove it as told by your health care provider.  ? Put ice in a plastic bag.  ? Place a towel between your skin and the bag, or between your cast and the bag.  ? Leave the ice on for 20 minutes, 2-3 times a day.  · Move your toes often to avoid stiffness and to lessen swelling.  · Raise (elevate) your lower leg above the level of your heart while you are lying down or sitting, whenever possible.  General instructions  · Do not put pressure on any part of the cast or splint until it is fully hardened, if applicable. This may take several hours.  · Do not drive until your health care provider approves. You should not drive or use heavy machinery while taking prescription pain medicine.  · Do not use any products that contain nicotine or tobacco, such as cigarettes and e-cigarettes. These can delay bone healing. If you need help quitting, ask your health care provider.  · Do not take baths, swim, or use a hot tub until your health care provider approves. Ask your health care provider if you may take showers. You may only be allowed to take sponge baths.  · Keep all follow-up visits as told by your health care provider. This is important.  Contact a health care provider if you have:  · Pain that gets worse or does not get better with medicine.  · Redness or swelling that gets worse.  · A fever.  Get help right away if you have:  · Any of the following symptoms in your toes or feet, even after loosening your splint:  ? Coldness.  ? Blue skin.  ? Numbness.  ? Tingling.  · Severe pain.  · Pain that gets worse when you move your toes.  · Chest pain.  · Shortness of  breath.  Summary  · A femoral shaft fracture is a break in the long, straight part (shaft) of your thigh bone (femur). This is almost always treated with surgery.  · This condition may be caused by a forceful impact.  · Do not use your leg to support your body weight until your health care provider says that you can. Follow weight-bearing restrictions as directed.  · Keep all follow-up visits as told by your health care provider. This is important.  This information is not intended to replace advice given to you by your health care provider. Make sure you discuss any questions you have with your health care provider.  Document Released: 09/27/2006 Document Revised: 02/04/2019 Document Reviewed: 02/04/2019  [employer] Patient Education © 2020 [employer]    Rib Fracture    A rib fracture is a break or crack in one of the bones of the ribs. The ribs are like a cage that goes around your upper chest. A broken or cracked rib is often painful, but most do not cause other problems. Most rib fractures usually heal on their own in 1-3 months.  Follow these instructions at home:  Managing pain, stiffness, and swelling  · If directed, apply ice to the injured area.  ? Put ice in a plastic bag.  ? Place a towel between your skin and the bag.  ? Leave the ice on for 20 minutes, 2-3 times a day.  · Take over-the-counter and prescription medicines only as told by your doctor.  Activity  · Avoid activities that cause pain to the injured area. Protect your injured area.  · Slowly increase activity as told by your doctor.  General instructions  · Do deep breathing as told by your doctor. You may be told to:  ? Take deep breaths many times a day.  ? Cough many times a day while hugging a pillow.  ? Use a device (incentive spirometer) to do deep breathing many times a day.  · Drink enough fluid to keep your pee (urine) clear or pale yellow.  · Do not wear a rib belt or binder. These do not allow you to breathe deeply.  · Keep all  follow-up visits as told by your doctor. This is important.  Contact a doctor if:  · You have a fever.  Get help right away if:  · You have trouble breathing.  · You are short of breath.  · You cannot stop coughing.  · You cough up thick or bloody spit (sputum).  · You feel sick to your stomach (nauseous), throw up (vomit), or have belly (abdominal) pain.  · Your pain gets worse and medicine does not help.  Summary  · A rib fracture is a break or crack in one of the bones of the ribs.  · Apply ice to the injured area and take medicines for pain as told by your doctor.  · Take deep breaths and cough many times a day. Hug a pillow every time you cough.  This information is not intended to replace advice given to you by your health care provider. Make sure you discuss any questions you have with your health care provider.  Document Released: 09/26/2009 Document Revised: 11/30/2018 Document Reviewed: 03/20/2018  [employer] Patient Education © 2020 [employer]    Depression / Suicide Risk    As you are discharged from this [facility] facility, it is important to learn how to keep safe from harming yourself.    Recognize the warning signs:  · Abrupt changes in personality, positive or negative- including increase in energy   · Giving away possessions  · Change in eating patterns- significant weight changes-  positive or negative  · Change in sleeping patterns- unable to sleep or sleeping all the time   · Unwillingness or inability to communicate  · Depression  · Unusual sadness, discouragement and loneliness  · Talk of wanting to die  · Neglect of personal appearance   · Rebelliousness- reckless behavior  · Withdrawal from people/activities they love  · Confusion- inability to concentrate     If you or a loved one observes any of these behaviors or has concerns about self-harm, here's what you can do:  · Talk about it- your feelings and reasons for harming yourself  · Remove any means that you might use to hurt  yourself (examples: pills, rope, extension cords, firearm)  · Get professional help from the community (Mental Health, Substance Abuse, psychological counseling)  · Do not be alone:Call your Safe Contact- someone whom you trust who will be there for you.  · Call your local CRISIS HOTLINE 805-4712 or 643-010-3876  · Call your local Children's Mobile Crisis Response Team [place] (642) 837-2201 or www.[place]  · Call the toll free National Suicide Prevention Hotlines   · National Suicide Prevention Lifeline 122-490-ESRS (0996)  · [employer] Line Network 800-SUICIDE (378-2765)          - Call or seek medical attention for questions or concerns  - Follow up with Dr. Rajput as needed.  - Follow up with Dr. Lafleur in 1 weeks time.  - Wear C-Collar at all times.  - No repeat bending twisting or lifting over 10 pounds.  - Follow up with Dr. High in 2 weeks time  - Weightbearing as tolerated.  - Follow up with primary care provider within one weeks time  - Resume regular diet  - May take over the counter acetaminophen or ibuprofen as needed for pain  - Continue daily over the counter stool softener while on narcotics  - No operation of machinery or motorized vehicles while under the influence of narcotics  - No alcohol, marijuana or illicit drug use while under the influence of narcotics  - In the event of a narcotic overdose naloxone (Narcan) is available without a prescription from any [facility] or [facility]  - May shower  - No contact sports, strenuous activities, or heavy lifting until cleared by outpatient provider  - If respiratory decompensation, persistent or worsening pain, neurological symtpoms, or signs or symptoms of infection occur seek medical attention

## 2022-04-25 NOTE — CARE PLAN
The patient is Stable - Low risk of patient condition declining or worsening    Shift Goals  Clinical Goals: Pain control  Patient Goals: Pain control, shower  Family Goals: na    Progress made toward(s) clinical / shift goals:  Knowledge deficit and pain control interventions in place     Patient is not progressing towards the following goals:      Problem: Knowledge Deficit - Standard  Goal: Patient and family/care givers will demonstrate understanding of plan of care, disease process/condition, diagnostic tests and medications  Outcome: Progressing  Note: Educated about plan of care and encouraged to ask questions, discharge planning      Problem: Pain - Standard  Goal: Alleviation of pain or a reduction in pain to the patient’s comfort goal  Outcome: Progressing  Flowsheets (Taken 4/25/2022 0947)  OB Pain Intervention:   Medication - See MAR   [place]  Note: Educated about the pain scale and non pharmacological pain methods, check the MAR
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
## 2022-04-25 NOTE — PROGRESS NOTES
Order for philadelphia collar has been submitted to [facility], if any questions you can reach [facility] at ext # [phone].

## 2022-04-25 NOTE — PROGRESS NOTES
CHW Fiorella provided 2 food bags from [facility] prior to discharge as patient has zero transportation.   TOBIN Barros will follow up with patient post discharge.

## 2022-04-25 NOTE — PROGRESS NOTES
Assumed care of pt at 0840  Report received and bedside rounding completed with night RN. Pt is calm no SOB, or in any acute distress noted.     Fall precautions in place. - Treaded non slip socks. Bed locked. Communication board updated with POC. Call light and pt belongings within reach - hourly rounding in place.   Awaiting philadelphia collar to arrive  Working on D/C planning

## 2022-04-25 NOTE — PROGRESS NOTES
Discharge paperwork discussed with the patient, signed copy scanned into [facility] with controlled substance consent. Patient waiting for ride in [facility].

## 2022-04-25 NOTE — CARE PLAN
Problem: Pain - Standard  Goal: Alleviation of pain or a reduction in pain to the patient’s comfort goal  Outcome: Progressing     Problem: Knowledge Deficit - Standard  Goal: Patient and family/care givers will demonstrate understanding of plan of care, disease process/condition, diagnostic tests and medications  Outcome: Progressing   The patient is Stable - Low risk of patient condition declining or worsening    Shift Goals  Clinical Goals: Pain control  Patient Goals: Pain control, shower  Family Goals: na    Progress made toward(s) clinical / shift goals:  Educate patient of available PRN pain medication per MAR. Reinforce fall/safety precautions.     Patient is not progressing towards the following goals:
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
## 2022-04-26 ENCOUNTER — PATIENT OUTREACH (OUTPATIENT)
Dept: HEALTH INFORMATION MANAGEMENT | Facility: OTHER | Age: 42
End: 2022-04-26
Payer: MEDICARE

## 2022-04-26 NOTE — PROGRESS NOTES
TOBIN Barros called patient via ATC post discharge and patient states he is doing well. He will be getting a shower chair today. Patient received all medications prior to discharge. Patient has no questions or concerns.   TOBIN Barros will discharge patient from [facility] and remove from case load and master list as all goals have been met.

## 2022-05-09 ENCOUNTER — OFFICE VISIT (OUTPATIENT)
Dept: URGENT CARE | Facility: PHYSICIAN GROUP | Age: 42
End: 2022-05-09
Payer: MEDICARE

## 2022-05-09 VITALS
WEIGHT: 270 LBS | HEIGHT: 70 IN | HEART RATE: 97 BPM | SYSTOLIC BLOOD PRESSURE: 140 MMHG | BODY MASS INDEX: 38.65 KG/M2 | DIASTOLIC BLOOD PRESSURE: 90 MMHG | OXYGEN SATURATION: 97 % | TEMPERATURE: 98.7 F | RESPIRATION RATE: 16 BRPM

## 2022-05-09 DIAGNOSIS — M10.9 ACUTE GOUT OF LEFT ANKLE, UNSPECIFIED CAUSE: ICD-10-CM

## 2022-05-09 PROCEDURE — 99213 OFFICE O/P EST LOW 20 MIN: CPT | Performed by: STUDENT IN AN ORGANIZED HEALTH CARE EDUCATION/TRAINING PROGRAM

## 2022-05-09 RX ORDER — PREDNISONE 20 MG/1
30 TABLET ORAL DAILY
Qty: 8 TABLET | Refills: 0 | Status: SHIPPED | OUTPATIENT
Start: 2022-05-09 | End: 2022-05-14

## 2022-05-09 ASSESSMENT — FIBROSIS 4 INDEX: FIB4 SCORE: 1.34

## 2022-05-10 NOTE — PROGRESS NOTES
Subjective:   Estiven Hills is a 41 y.o. male who presents for Gout (Poss gout, L foot pain, swelling, redness, x3 days )      HPI:  Pleasant 41-year-old male presents to clinic for acute gout flare that is been going on for approximately 3 days.  Current flare is in the left ankle.  Patient reports pain with walking, swelling, and redness.  Patient has had several episodes of gout in the past and states this is exactly like his past episodes.  Patient reports good resolution of his past episodes with prednisone as NSAIDs do not work for him.  Patient denies numbness, tingling, burning, radiation of pain up the leg, fever, chills, rash, sore throat, chest pain, palpitations, lower leg swelling, cough, sputum production, shortness of breath, wheezing, nausea, vomiting, abdominal pain, diarrhea, dizziness, or headache.  Patient denies any recent falls or trauma.  Patient denies any changes in diet or medications.      Medications:    • acetaminophen Tabs  • MELATONIN PO  • metaxalone Tabs  • MILK THISTLE PO  • multivitamin Tabs  • oxyCODONE immediate-release Tabs  • predniSONE Tabs  • senna-docusate Tabs  • VIAGRA PO  • zolpidem Tabs    Allergies: Codeine, Sertraline, Venlafaxine, and Vicodin [hydrocodone-acetaminophen]    Problem List: Estiven Hills does not have any pertinent problems on file.    Surgical History:  Past Surgical History:   Procedure Laterality Date   • IRRIGATION & DEBRIDEMENT GENERAL Right 5/27/2016    Procedure: IRRIGATION & DEBRIDEMENT - Abdominal wall abscess ;  Surgeon: Suzanne Amato M.D.;  Location: SURGERY [facility];  Service:    • EXPLORATORY LAPAROTOMY N/A 5/11/2016    Procedure: EXPLORATORY LAPAROTOMY , SMALL BOWEL RESECTION, RIGHT COLECTOMY;  Surgeon: Carlito Barber M.D.;  Location: SURGERY [facility];  Service:    • OTHER CARDIAC SURGERY         Past Social Hx: Estiven Hilsl  reports that he has never smoked. He has never used smokeless tobacco. He reports  "previous alcohol use. He reports current drug use.     Past Family Hx:  Estiven Hills family history includes Alcohol/Drug in his brother and mother.     Problem list, medications, and allergies reviewed by myself today in Epic.     Objective:     /90 (BP Location: Left arm, Patient Position: Sitting, BP Cuff Size: Adult)   Pulse 97   Temp 37.1 °C (98.7 °F) (Temporal)   Resp 16   Ht 1.778 m (5' 10\")   Wt 122 kg (270 lb)   SpO2 97%   BMI 38.74 kg/m²     Physical Exam  Vitals reviewed.   Constitutional:       Appearance: Normal appearance.   Cardiovascular:      Rate and Rhythm: Normal rate and regular rhythm.      Pulses: Normal pulses.      Heart sounds: Normal heart sounds. No murmur heard.  Pulmonary:      Effort: Pulmonary effort is normal. No respiratory distress.      Breath sounds: Normal breath sounds. No stridor. No wheezing, rhonchi or rales.   Musculoskeletal:      Right ankle: Normal.      Left ankle: Swelling present. No deformity, ecchymosis or lacerations. Tenderness present over the lateral malleolus. No medial malleolus, ATF ligament, base of 5th metatarsal or proximal fibula tenderness. Normal range of motion.      Comments: Patient is able to bear weight on the foot but does have pain when doing so.  Sensation intact.  2+ pedal pulse.  Cap refill less than 2 seconds.  No signs of trauma.   Skin:     General: Skin is warm and dry.      Capillary Refill: Capillary refill takes less than 2 seconds.      Findings: No erythema, lesion or rash.   Neurological:      General: No focal deficit present.      Mental Status: He is alert and oriented to person, place, and time.         Assessment/Plan:     Diagnosis and associated orders:     1. Acute gout of left ankle, unspecified cause  predniSONE (DELTASONE) 20 MG Tab      Comments/MDM:     • Patient's presentation physical exam findings are consistent with acute gout flare of the left ankle.  Patient has multiple episodes of gout flares " in the past with good resolution of symptoms using prednisone.  Patient states that he has tried NSAIDs in the past which have never resolved his gout.  Patient states that he tolerates prednisone well and has had no side effects of this medication.  • Patient was prescribed prednisone and advised on use this medication and the possible side effects including allergic reaction.  Patient has good understanding of this and is agreeable to plan.  • Patient may also use Tylenol as needed for discomfort.  Patient should try ice the foot approximately 3-5 times a day to reduce any acute inflammation.  • ED precautions were given.  Patient has good understanding of the signs and symptoms that warrant immediate reevaluation.         Differential diagnosis, natural history, supportive care, and indications for immediate follow-up discussed.    Advised the patient to follow-up with the primary care physician for recheck, reevaluation, and consideration of further management.    Please note that this dictation was created using voice recognition software. I have made a reasonable attempt to correct obvious errors, but I expect that there are errors of grammar and possibly content that I did not discover before finalizing the note.    Electronically signed by Avery Cazares PA-C.

## 2022-06-05 ENCOUNTER — HOSPITAL ENCOUNTER (EMERGENCY)
Facility: MEDICAL CENTER | Age: 42
End: 2022-06-05
Attending: EMERGENCY MEDICINE
Payer: MEDICARE

## 2022-06-05 VITALS
RESPIRATION RATE: 18 BRPM | BODY MASS INDEX: 38.5 KG/M2 | WEIGHT: 275 LBS | HEIGHT: 71 IN | TEMPERATURE: 98.1 F | HEART RATE: 99 BPM | OXYGEN SATURATION: 98 % | SYSTOLIC BLOOD PRESSURE: 161 MMHG | DIASTOLIC BLOOD PRESSURE: 82 MMHG

## 2022-06-05 DIAGNOSIS — M10.9 ACUTE GOUT OF LEFT ANKLE, UNSPECIFIED CAUSE: ICD-10-CM

## 2022-06-05 DIAGNOSIS — M10.9 ACUTE GOUT OF LEFT KNEE, UNSPECIFIED CAUSE: ICD-10-CM

## 2022-06-05 PROCEDURE — 96372 THER/PROPH/DIAG INJ SC/IM: CPT | Mod: XU

## 2022-06-05 PROCEDURE — 700111 HCHG RX REV CODE 636 W/ 250 OVERRIDE (IP): Performed by: EMERGENCY MEDICINE

## 2022-06-05 PROCEDURE — 99284 EMERGENCY DEPT VISIT MOD MDM: CPT

## 2022-06-05 RX ORDER — ONDANSETRON 4 MG/1
4 TABLET, ORALLY DISINTEGRATING ORAL ONCE
Status: COMPLETED | OUTPATIENT
Start: 2022-06-05 | End: 2022-06-05

## 2022-06-05 RX ORDER — COLCHICINE 0.6 MG/1
0.6 TABLET ORAL 2 TIMES DAILY
Qty: 12 TABLET | Refills: 0 | Status: SHIPPED | OUTPATIENT
Start: 2022-06-05 | End: 2022-06-11

## 2022-06-05 RX ORDER — PREDNISONE 20 MG/1
40 TABLET ORAL DAILY
Qty: 12 TABLET | Refills: 0 | Status: SHIPPED
Start: 2022-06-05 | End: 2022-06-11

## 2022-06-05 RX ORDER — METHYLPREDNISOLONE 4 MG/1
TABLET ORAL
Qty: 21 EACH | Refills: 0 | Status: SHIPPED | OUTPATIENT
Start: 2022-06-05 | End: 2022-06-05

## 2022-06-05 RX ADMIN — ONDANSETRON 4 MG: 4 TABLET, ORALLY DISINTEGRATING ORAL at 18:16

## 2022-06-05 RX ADMIN — MORPHINE SULFATE 5 MG: 10 INJECTION INTRAVENOUS at 19:19

## 2022-06-05 RX ADMIN — MORPHINE SULFATE 10 MG: 10 INJECTION INTRAVENOUS at 18:16

## 2022-06-05 ASSESSMENT — PAIN DESCRIPTION - PAIN TYPE: TYPE: ACUTE PAIN

## 2022-06-05 ASSESSMENT — FIBROSIS 4 INDEX: FIB4 SCORE: 1.34
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
## 2022-06-06 NOTE — ED NOTES
Patient educated on discharge instructions, follow up appointments, prescriptions, and home care. Patient verbalized understanding. Patient wheeled to  [facility].

## 2022-06-06 NOTE — ED NOTES
Pt stating 10 mg of morphine was not enough medication for pain coberage. Pt also stating the solumedrol that the MD prescribed the pt is too expensive and he wants prednisone. ERP notified.

## 2022-07-28 ENCOUNTER — OFFICE VISIT (OUTPATIENT)
Dept: URGENT CARE | Facility: PHYSICIAN GROUP | Age: 42
End: 2022-07-28
Payer: MEDICARE

## 2022-07-28 VITALS
SYSTOLIC BLOOD PRESSURE: 118 MMHG | DIASTOLIC BLOOD PRESSURE: 76 MMHG | BODY MASS INDEX: 37.1 KG/M2 | HEIGHT: 71 IN | OXYGEN SATURATION: 96 % | HEART RATE: 100 BPM | WEIGHT: 265 LBS | TEMPERATURE: 98.6 F | RESPIRATION RATE: 16 BRPM

## 2022-07-28 DIAGNOSIS — M10.9 ACUTE GOUT OF LEFT ANKLE, UNSPECIFIED CAUSE: ICD-10-CM

## 2022-07-28 DIAGNOSIS — Z76.0 ENCOUNTER FOR MEDICATION REFILL: ICD-10-CM

## 2022-07-28 PROCEDURE — 99213 OFFICE O/P EST LOW 20 MIN: CPT | Performed by: STUDENT IN AN ORGANIZED HEALTH CARE EDUCATION/TRAINING PROGRAM

## 2022-07-28 RX ORDER — PREDNISONE 20 MG/1
20 TABLET ORAL 2 TIMES DAILY
Qty: 14 TABLET | Refills: 0 | Status: SHIPPED | OUTPATIENT
Start: 2022-07-28 | End: 2022-08-04

## 2022-07-28 RX ORDER — MELOXICAM 7.5 MG/1
15 TABLET ORAL
Qty: 30 TABLET | Refills: 0 | Status: SHIPPED | OUTPATIENT
Start: 2022-07-28 | End: 2022-08-27

## 2022-07-28 ASSESSMENT — FIBROSIS 4 INDEX: FIB4 SCORE: 1.34

## 2022-07-28 NOTE — PROGRESS NOTES
Subjective:   Estiven Hills is a 41 y.o. male who presents for Gout (Started yesterday left ankle )      HPI:  Pleasant 41-year-old male presents to clinic for acute gout flare to his left ankle.  He does report pain, swelling, and increased warmth that started yesterday.  Patient does have a long history of gout and states he is no longer on his allopurinol but is scheduled to meet with his PCP for additional prescription of this medication.  His last episode of gout was on 6/5/2022.  He reports that his symptoms are consistent with his past episodes of gout.  He states that he typically responds best to prednisone.  He is also here for medication refill of his meloxicam.  He denies injury, falls, trauma, numbness, tingling, burning, reduced range of motion, inability to bear weight, nausea, vomiting, abdominal pain, blurred vision, double vision, chest pain, palpitations, lower leg swelling, lower leg pain, shortness of breath, dizziness, or headache.  He has not used anything for symptoms at this time.      Medications:    • acetaminophen Tabs  • MELATONIN PO  • meloxicam Tabs  • MILK THISTLE PO  • multivitamin Tabs  • predniSONE Tabs  • senna-docusate Tabs  • VIAGRA PO  • zolpidem Tabs    Allergies: Codeine, Sertraline, Venlafaxine, and Vicodin [hydrocodone-acetaminophen]    Problem List: Estiven Hills does not have any pertinent problems on file.    Surgical History:  Past Surgical History:   Procedure Laterality Date   • IRRIGATION & DEBRIDEMENT GENERAL Right 5/27/2016    Procedure: IRRIGATION & DEBRIDEMENT - Abdominal wall abscess ;  Surgeon: Suzanne Amato M.D.;  Location: SURGERY [facility];  Service:    • EXPLORATORY LAPAROTOMY N/A 5/11/2016    Procedure: EXPLORATORY LAPAROTOMY , SMALL BOWEL RESECTION, RIGHT COLECTOMY;  Surgeon: Carlito Barber M.D.;  Location: SURGERY [facility];  Service:    • OTHER CARDIAC SURGERY         Past Social Hx: Estiven Hills  reports that he has  "never smoked. He has never used smokeless tobacco. He reports previous alcohol use. He reports current drug use.     Past Family Hx:  Estiven Hills family history includes Alcohol/Drug in his brother and mother.     Problem list, medications, and allergies reviewed by myself today in Epic.     Objective:     /76 (BP Location: Right arm, Patient Position: Sitting, BP Cuff Size: Large adult)   Pulse 100   Temp 37 °C (98.6 °F) (Temporal)   Resp 16   Ht 1.803 m (5' 11\")   Wt 120 kg (265 lb)   SpO2 96%   BMI 36.96 kg/m²     Physical Exam  Vitals reviewed.   Constitutional:       General: He is not in acute distress.     Appearance: Normal appearance.   Cardiovascular:      Rate and Rhythm: Normal rate and regular rhythm.      Pulses: Normal pulses.      Heart sounds: Normal heart sounds. No murmur heard.  Pulmonary:      Effort: Pulmonary effort is normal. No respiratory distress.      Breath sounds: Normal breath sounds. No stridor. No wheezing, rhonchi or rales.   Musculoskeletal:      Right ankle: Normal.      Left ankle: Swelling present. No deformity, ecchymosis or lacerations. Tenderness present. Normal range of motion. Normal pulse.        Legs:       Comments: TTP diffusely over the ankle.  There is tenderness to light touch.  Cap refill less than 2 seconds.  Sensation intact.  Full active and passive range of motion of the ankle.  5 out of 5 strength during plantarflexion and dorsiflexion.  Patient is able to bear weight but is currently using a cane for assistance.   Skin:     General: Skin is warm and dry.      Capillary Refill: Capillary refill takes less than 2 seconds.      Findings: No erythema, lesion or rash.   Neurological:      General: No focal deficit present.      Mental Status: He is alert and oriented to person, place, and time.         Assessment/Plan:     Diagnosis and associated orders:     1. Acute gout of left ankle, unspecified cause  meloxicam (MOBIC) 7.5 MG Tab    " predniSONE (DELTASONE) 20 MG Tab   2. Encounter for medication refill        Comments/MDM:     • Patient's presentation physical exam findings are consistent with acute gout flare of the left ankle.  Patient was prescribed prednisone and educated use this medication the possible side effects.  Patient is agreeable to this.  Patient was also given a refill of his meloxicam.  Patient was advised not to use meloxicam while on prednisone.  Patient has good understanding of this.  • He may ice 3-5 times a day for 20 minutes at a time to reduce acute inflammation and pain.  • ED/return precautions were given.  Patient has good understanding the signs and symptoms that warrant immediate reevaluation.  • Follow-up with PCP as soon as possible for possible maintenance medication for gout.         Differential diagnosis, natural history, supportive care, and indications for immediate follow-up discussed.    Advised the patient to follow-up with the primary care physician for recheck, reevaluation, and consideration of further management.    Please note that this dictation was created using voice recognition software. I have made a reasonable attempt to correct obvious errors, but I expect that there are errors of grammar and possibly content that I did not discover before finalizing the note.    Electronically signed by Avery Cazares PA-C.

## 2022-08-10 ENCOUNTER — OFFICE VISIT (OUTPATIENT)
Dept: URGENT CARE | Facility: PHYSICIAN GROUP | Age: 42
End: 2022-08-10
Payer: MEDICARE

## 2022-08-10 VITALS
TEMPERATURE: 98.9 F | SYSTOLIC BLOOD PRESSURE: 118 MMHG | DIASTOLIC BLOOD PRESSURE: 82 MMHG | OXYGEN SATURATION: 96 % | BODY MASS INDEX: 37.1 KG/M2 | HEART RATE: 91 BPM | HEIGHT: 71 IN | RESPIRATION RATE: 16 BRPM | WEIGHT: 265 LBS

## 2022-08-10 DIAGNOSIS — M10.9 ACUTE GOUT OF LEFT ANKLE, UNSPECIFIED CAUSE: ICD-10-CM

## 2022-08-10 DIAGNOSIS — M10.9 ACUTE GOUT OF LEFT KNEE, UNSPECIFIED CAUSE: ICD-10-CM

## 2022-08-10 PROCEDURE — 99214 OFFICE O/P EST MOD 30 MIN: CPT | Performed by: NURSE PRACTITIONER

## 2022-08-10 RX ORDER — PREDNISONE 10 MG/1
TABLET ORAL
Qty: 20 TABLET | Refills: 0 | Status: SHIPPED | OUTPATIENT
Start: 2022-08-10 | End: 2022-08-31

## 2022-08-10 ASSESSMENT — FIBROSIS 4 INDEX: FIB4 SCORE: 1.34

## 2022-08-10 ASSESSMENT — PAIN SCALES - GENERAL: PAINLEVEL: 8=MODERATE-SEVERE PAIN
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
## 2022-08-31 ENCOUNTER — HOSPITAL ENCOUNTER (EMERGENCY)
Facility: MEDICAL CENTER | Age: 42
End: 2022-08-31
Attending: EMERGENCY MEDICINE
Payer: MEDICARE

## 2022-08-31 VITALS
RESPIRATION RATE: 16 BRPM | DIASTOLIC BLOOD PRESSURE: 99 MMHG | OXYGEN SATURATION: 93 % | WEIGHT: 260 LBS | SYSTOLIC BLOOD PRESSURE: 161 MMHG | HEIGHT: 70 IN | TEMPERATURE: 98 F | HEART RATE: 97 BPM | BODY MASS INDEX: 37.22 KG/M2

## 2022-08-31 DIAGNOSIS — M10.9 ACUTE GOUTY ARTHROPATHY: ICD-10-CM

## 2022-08-31 DIAGNOSIS — F43.29 ADJUSTMENT DISORDER WITH OTHER SYMPTOM: ICD-10-CM

## 2022-08-31 DIAGNOSIS — F10.10 ALCOHOL ABUSE: ICD-10-CM

## 2022-08-31 LAB
ALBUMIN SERPL BCP-MCNC: 4.9 G/DL (ref 3.2–4.9)
ALBUMIN/GLOB SERPL: 2 G/DL
ALP SERPL-CCNC: 91 U/L (ref 30–99)
ALT SERPL-CCNC: 37 U/L (ref 2–50)
ANION GAP SERPL CALC-SCNC: 25 MMOL/L (ref 7–16)
AST SERPL-CCNC: 41 U/L (ref 12–45)
BASOPHILS # BLD AUTO: 1 % (ref 0–1.8)
BASOPHILS # BLD: 0.08 K/UL (ref 0–0.12)
BILIRUB SERPL-MCNC: 2.1 MG/DL (ref 0.1–1.5)
BUN SERPL-MCNC: 17 MG/DL (ref 8–22)
CALCIUM SERPL-MCNC: 9.2 MG/DL (ref 8.5–10.5)
CHLORIDE SERPL-SCNC: 97 MMOL/L (ref 96–112)
CO2 SERPL-SCNC: 20 MMOL/L (ref 20–33)
CREAT SERPL-MCNC: 0.78 MG/DL (ref 0.5–1.4)
EOSINOPHIL # BLD AUTO: 0.02 K/UL (ref 0–0.51)
EOSINOPHIL NFR BLD: 0.3 % (ref 0–6.9)
ERYTHROCYTE [DISTWIDTH] IN BLOOD BY AUTOMATED COUNT: 56.1 FL (ref 35.9–50)
GFR SERPLBLD CREATININE-BSD FMLA CKD-EPI: 116 ML/MIN/1.73 M 2
GLOBULIN SER CALC-MCNC: 2.5 G/DL (ref 1.9–3.5)
GLUCOSE SERPL-MCNC: 89 MG/DL (ref 65–99)
HCT VFR BLD AUTO: 44.4 % (ref 42–52)
HGB BLD-MCNC: 15 G/DL (ref 14–18)
IMM GRANULOCYTES # BLD AUTO: 0.03 K/UL (ref 0–0.11)
IMM GRANULOCYTES NFR BLD AUTO: 0.4 % (ref 0–0.9)
LIPASE SERPL-CCNC: 21 U/L (ref 11–82)
LYMPHOCYTES # BLD AUTO: 0.9 K/UL (ref 1–4.8)
LYMPHOCYTES NFR BLD: 11.8 % (ref 22–41)
MCH RBC QN AUTO: 31.8 PG (ref 27–33)
MCHC RBC AUTO-ENTMCNC: 33.8 G/DL (ref 33.7–35.3)
MCV RBC AUTO: 94.3 FL (ref 81.4–97.8)
MONOCYTES # BLD AUTO: 0.4 K/UL (ref 0–0.85)
MONOCYTES NFR BLD AUTO: 5.2 % (ref 0–13.4)
NEUTROPHILS # BLD AUTO: 6.22 K/UL (ref 1.82–7.42)
NEUTROPHILS NFR BLD: 81.3 % (ref 44–72)
NRBC # BLD AUTO: 0 K/UL
NRBC BLD-RTO: 0 /100 WBC
PLATELET # BLD AUTO: 191 K/UL (ref 164–446)
PMV BLD AUTO: 9.5 FL (ref 9–12.9)
POTASSIUM SERPL-SCNC: 3.8 MMOL/L (ref 3.6–5.5)
PROT SERPL-MCNC: 7.4 G/DL (ref 6–8.2)
RBC # BLD AUTO: 4.71 M/UL (ref 4.7–6.1)
SODIUM SERPL-SCNC: 142 MMOL/L (ref 135–145)
WBC # BLD AUTO: 7.7 K/UL (ref 4.8–10.8)

## 2022-08-31 PROCEDURE — 80053 COMPREHEN METABOLIC PANEL: CPT

## 2022-08-31 PROCEDURE — 96374 THER/PROPH/DIAG INJ IV PUSH: CPT

## 2022-08-31 PROCEDURE — 83690 ASSAY OF LIPASE: CPT

## 2022-08-31 PROCEDURE — 96376 TX/PRO/DX INJ SAME DRUG ADON: CPT

## 2022-08-31 PROCEDURE — 85025 COMPLETE CBC W/AUTO DIFF WBC: CPT

## 2022-08-31 PROCEDURE — 36415 COLL VENOUS BLD VENIPUNCTURE: CPT

## 2022-08-31 PROCEDURE — 96375 TX/PRO/DX INJ NEW DRUG ADDON: CPT

## 2022-08-31 PROCEDURE — 700105 HCHG RX REV CODE 258: Performed by: EMERGENCY MEDICINE

## 2022-08-31 PROCEDURE — 700111 HCHG RX REV CODE 636 W/ 250 OVERRIDE (IP): Performed by: EMERGENCY MEDICINE

## 2022-08-31 PROCEDURE — 99284 EMERGENCY DEPT VISIT MOD MDM: CPT

## 2022-08-31 RX ORDER — ONDANSETRON 2 MG/ML
4 INJECTION INTRAMUSCULAR; INTRAVENOUS ONCE
Status: COMPLETED | OUTPATIENT
Start: 2022-08-31 | End: 2022-08-31

## 2022-08-31 RX ORDER — PREDNISONE 20 MG/1
40 TABLET ORAL DAILY
Qty: 10 TABLET | Refills: 0 | Status: SHIPPED | OUTPATIENT
Start: 2022-08-31 | End: 2022-09-05

## 2022-08-31 RX ORDER — SODIUM CHLORIDE 9 MG/ML
1000 INJECTION, SOLUTION INTRAVENOUS ONCE
Status: COMPLETED | OUTPATIENT
Start: 2022-08-31 | End: 2022-08-31

## 2022-08-31 RX ORDER — LORAZEPAM 2 MG/ML
1 INJECTION INTRAMUSCULAR ONCE
Status: COMPLETED | OUTPATIENT
Start: 2022-08-31 | End: 2022-08-31

## 2022-08-31 RX ADMIN — SODIUM CHLORIDE 1000 ML: 9 INJECTION, SOLUTION INTRAVENOUS at 06:00

## 2022-08-31 RX ADMIN — LORAZEPAM 1 MG: 2 INJECTION INTRAMUSCULAR; INTRAVENOUS at 07:41

## 2022-08-31 RX ADMIN — ONDANSETRON 4 MG: 2 INJECTION INTRAMUSCULAR; INTRAVENOUS at 04:47

## 2022-08-31 RX ADMIN — ONDANSETRON 4 MG: 2 INJECTION INTRAMUSCULAR; INTRAVENOUS at 08:16

## 2022-08-31 RX ADMIN — LORAZEPAM 1 MG: 2 INJECTION, SOLUTION INTRAMUSCULAR; INTRAVENOUS at 04:47

## 2022-08-31 ASSESSMENT — ENCOUNTER SYMPTOMS
COUGH: 0
MYALGIAS: 1
BLOOD IN STOOL: 0
FEVER: 0
NAUSEA: 1
ABDOMINAL PAIN: 1
SHORTNESS OF BREATH: 0
VOMITING: 1

## 2022-08-31 ASSESSMENT — FIBROSIS 4 INDEX: FIB4 SCORE: 1.38

## 2022-08-31 ASSESSMENT — LIFESTYLE VARIABLES: SUBSTANCE_ABUSE: 1

## 2022-08-31 NOTE — ED NOTES
All discharge instructions given to pt as well as Rx for prednisone.  Pt advised not to drink or drive.  Pt verbalized understanding of all discharge instructions. All lines removed prior to discharge. All questions answered.

## 2022-08-31 NOTE — ED NOTES
Pt resting in room. Equal chest rise and fall seen. Call light within reach. No further interventions at this time.
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
## 2022-08-31 NOTE — ED NOTES
Assumed care of pt. Pt jose, reports that he is in withdrawal. HTN, tachycardia noted. ERP notified.

## 2022-08-31 NOTE — ED TRIAGE NOTES
"Chief Complaint   Patient presents with    Detox     Pt states he has been drinking for the past 5 days, pt states he has his last drink 8 hrs ago, pt states he feels his arm starting to shake, pt states he needs help going thru detox      BP (!) 195/135   Pulse 94   Temp 36.2 °C (97.1 °F) (Temporal)   Resp 18   Ht 1.778 m (5' 10\")   Wt 118 kg (260 lb)   SpO2 94% Comment: [facility]  BMI 37.31 kg/m²     "

## 2022-10-13 ENCOUNTER — OFFICE VISIT (OUTPATIENT)
Dept: URGENT CARE | Facility: PHYSICIAN GROUP | Age: 42
End: 2022-10-13
Payer: MEDICARE

## 2022-10-13 VITALS
HEIGHT: 70 IN | OXYGEN SATURATION: 94 % | TEMPERATURE: 98.2 F | WEIGHT: 260 LBS | BODY MASS INDEX: 37.22 KG/M2 | RESPIRATION RATE: 16 BRPM | DIASTOLIC BLOOD PRESSURE: 80 MMHG | HEART RATE: 78 BPM | SYSTOLIC BLOOD PRESSURE: 122 MMHG

## 2022-10-13 DIAGNOSIS — M10.9 ACUTE GOUT OF LEFT ANKLE, UNSPECIFIED CAUSE: ICD-10-CM

## 2022-10-13 PROCEDURE — 99213 OFFICE O/P EST LOW 20 MIN: CPT | Performed by: PHYSICIAN ASSISTANT

## 2022-10-13 RX ORDER — PREDNISONE 20 MG/1
40 TABLET ORAL DAILY
Qty: 10 TABLET | Refills: 0 | Status: SHIPPED | OUTPATIENT
Start: 2022-10-13 | End: 2022-10-18

## 2022-10-13 ASSESSMENT — FIBROSIS 4 INDEX: FIB4 SCORE: 1.41

## 2022-10-13 NOTE — PROGRESS NOTES
"Subjective:   Estiven Hills is a 42 y.o. male who presents for Gout (Left ankle)      HPI  Patient is a 42-year-old male who presents to the urgent care with complaints of left ankle pain due to a gout flareup for the past 2 days.  He reports a history of chronic intermittent gout flareups.  He has not drank alcohol in a couple years.  Associated swelling and pain exacerbation with ambulation and weightbearing.  Denies any fever, chills, numbness, tingling.  Denies any recent injury.        Medications:    acetaminophen Tabs  MELATONIN PO  MILK THISTLE PO  multivitamin Tabs  senna-docusate Tabs  VIAGRA PO  zolpidem Tabs    Allergies: Codeine, Sertraline, Venlafaxine, and Vicodin [hydrocodone-acetaminophen]    Problem List: Estiven Hills does not have any pertinent problems on file.    Surgical History:  Past Surgical History:   Procedure Laterality Date    IRRIGATION & DEBRIDEMENT GENERAL Right 5/27/2016    Procedure: IRRIGATION & DEBRIDEMENT - Abdominal wall abscess ;  Surgeon: Suzanne Amato M.D.;  Location: SURGERY [facility], [place];  Service:     EXPLORATORY LAPAROTOMY N/A 5/11/2016    Procedure: EXPLORATORY LAPAROTOMY , SMALL BOWEL RESECTION, RIGHT COLECTOMY;  Surgeon: Carlito Barber M.D.;  Location: SURGERY [facility], [place];  Service:     OTHER CARDIAC SURGERY         Past Social Hx: Estiven Hills  reports that he has never smoked. He has never used smokeless tobacco. He reports current alcohol use. He reports current drug use. Drug: Marijuana.     Past Family Hx:  Estiven Hills family history includes Alcohol/Drug in his brother and mother.     Problem list, medications, and allergies reviewed by myself today in Epic.     Objective:     /80   Pulse 78   Temp 36.8 °C (98.2 °F) (Temporal)   Resp 16   Ht 1.778 m (5' 10\")   Wt 118 kg (260 lb)   SpO2 94%   BMI 37.31 kg/m²     Physical Exam  Vitals reviewed.   Constitutional:       General: He is not in acute distress.     " Appearance: Normal appearance. He is not ill-appearing or toxic-appearing.   Eyes:      Conjunctiva/sclera: Conjunctivae normal.      Pupils: Pupils are equal, round, and reactive to light.   Cardiovascular:      Rate and Rhythm: Normal rate.   Pulmonary:      Effort: Pulmonary effort is normal.   Musculoskeletal:      Cervical back: Neck supple.      Left ankle: Swelling present. No deformity, ecchymosis or lacerations. Tenderness (diffuse) present. Normal range of motion.      Left Achilles Tendon: Normal.      Left foot: Normal capillary refill. Swelling (proximal foot) present. No crepitus. Normal pulse.   Lymphadenopathy:      Cervical: No cervical adenopathy.   Skin:     General: Skin is warm and dry.   Neurological:      General: No focal deficit present.      Mental Status: He is alert and oriented to person, place, and time.   Psychiatric:         Mood and Affect: Mood normal.         Behavior: Behavior normal.       Diagnosis and associated orders:     1. Acute gout of left ankle, unspecified cause  - predniSONE (DELTASONE) 20 MG Tab; Take 2 Tablets by mouth every day for 5 days.  Dispense: 10 Tablet; Refill: 0  - diclofenac sodium (VOLTAREN) 1 % Gel; Apply 4 grams to affected area no more than four times per day  Dispense: 100 g; Refill: 0     Comments/MDM:     Patient presenting symptoms and exam findings are consistent with gout flareup.  He reports a history of chronic intermittent gout flareups especially to his left ankle and this feels similar.  No new injury or trauma.  Skin is intact without signs of cellulitis.  We will start patient on prednisone.  Avoid Tylenol due to history of liver cirrhosis.  May try diclofenac gel.  Elevation, ice application.  Avoid purine rich foods.     I personally reviewed prior external notes and test results pertinent to today's visit. Pathogenesis of diagnosis discussed including typical length and natural progression. Supportive care, natural history, differential  diagnoses, and indications for immediate follow-up discussed. Patient expresses understanding and agrees to plan. Patient denies any other questions or concerns.     Follow-up with the primary care physician for recheck, reevaluation, and consideration of further management.    Please note that this dictation was created using voice recognition software. I have made a reasonable attempt to correct obvious errors, but I expect that there are errors of grammar and possibly content that I did not discover before finalizing the note.    This note was electronically signed by Jaylon Plummer PA-C

## 2022-10-25 ENCOUNTER — OFFICE VISIT (OUTPATIENT)
Dept: URGENT CARE | Facility: PHYSICIAN GROUP | Age: 42
End: 2022-10-25
Payer: MEDICARE

## 2022-10-25 ENCOUNTER — APPOINTMENT (OUTPATIENT)
Dept: RADIOLOGY | Facility: IMAGING CENTER | Age: 42
End: 2022-10-25
Payer: MEDICARE

## 2022-10-25 VITALS
HEIGHT: 70 IN | WEIGHT: 260 LBS | RESPIRATION RATE: 18 BRPM | BODY MASS INDEX: 37.22 KG/M2 | HEART RATE: 93 BPM | SYSTOLIC BLOOD PRESSURE: 118 MMHG | DIASTOLIC BLOOD PRESSURE: 58 MMHG | TEMPERATURE: 97.9 F | OXYGEN SATURATION: 96 %

## 2022-10-25 DIAGNOSIS — M10.9 ACUTE GOUT OF MULTIPLE SITES, UNSPECIFIED CAUSE: ICD-10-CM

## 2022-10-25 DIAGNOSIS — M25.571 ACUTE BILATERAL ANKLE PAIN: ICD-10-CM

## 2022-10-25 DIAGNOSIS — S99.912A INJURY OF LEFT ANKLE, INITIAL ENCOUNTER: ICD-10-CM

## 2022-10-25 DIAGNOSIS — M25.572 ACUTE BILATERAL ANKLE PAIN: ICD-10-CM

## 2022-10-25 PROCEDURE — 73610 X-RAY EXAM OF ANKLE: CPT | Mod: TC,LT | Performed by: NURSE PRACTITIONER

## 2022-10-25 PROCEDURE — 99214 OFFICE O/P EST MOD 30 MIN: CPT

## 2022-10-25 RX ORDER — PREDNISONE 10 MG/1
40 TABLET ORAL DAILY
Qty: 16 TABLET | Refills: 0 | Status: SHIPPED | OUTPATIENT
Start: 2022-10-25 | End: 2022-10-29

## 2022-10-25 RX ORDER — KETOROLAC TROMETHAMINE 30 MG/ML
30 INJECTION, SOLUTION INTRAMUSCULAR; INTRAVENOUS ONCE
Status: COMPLETED | OUTPATIENT
Start: 2022-10-25 | End: 2022-10-25

## 2022-10-25 RX ORDER — PREDNISONE 10 MG/1
20 TABLET ORAL DAILY
Qty: 10 TABLET | Refills: 0 | Status: SHIPPED
Start: 2022-10-25 | End: 2022-10-25

## 2022-10-25 RX ORDER — PREDNISONE 50 MG/1
TABLET ORAL
COMMUNITY
Start: 2022-08-24 | End: 2022-10-25

## 2022-10-25 RX ADMIN — KETOROLAC TROMETHAMINE 30 MG: 30 INJECTION, SOLUTION INTRAMUSCULAR; INTRAVENOUS at 14:51

## 2022-10-25 RX ADMIN — KETOROLAC TROMETHAMINE 30 MG: 30 INJECTION, SOLUTION INTRAMUSCULAR; INTRAVENOUS at 14:17

## 2022-10-25 ASSESSMENT — FIBROSIS 4 INDEX: FIB4 SCORE: 1.41

## 2022-10-25 NOTE — PROGRESS NOTES
Subjective     Estiven Hills is a 42 y.o. male who presents with Ankle Pain (Both ankle pain,x2 weeks)            HPI    Patient presents with symptoms started 4 days ago.  He endorses bilateral ankle pain.  He reports twisting his left ankle while walking the dark.  He did not twist his right ankle, but this started hurting thereafter.  Reports pain to be achy to sharp, 5 out of 10 currently, can go up to 8-9 out of 10 with walking and weightbearing.  Patient reports his pain applying ice and elevating her lower extremities, providing some relief.  He is not taking any medications for this pain.  Patient was seen here at the urgent care 2 weeks ago for acute flare of gout and was given 3 days of prednisone.  He reports improvement in symptoms, until this injury 4 days ago.    Patient's current problem list, medications, and past medical/surgical history were reviewed in Epic.    PMH:  has a past medical history of Alcohol abuse, Dyslipidemia, Gout, HTN (hypertension), Liver disease, and Spinal disorder.    He has no past medical history of Asthma, Heart attack (HCC), or Type II or unspecified type diabetes mellitus without mention of complication, not stated as uncontrolled.  MEDS:   Current Outpatient Medications:     diclofenac sodium (VOLTAREN) 1 % Gel, Apply 4 grams to affected area no more than four times per day, Disp: 100 g, Rfl: 0    acetaminophen (TYLENOL) 325 MG Tab, Take 2 Tablets by mouth every 6 hours as needed for Mild Pain., Disp: 30 Tablet, Rfl: 0    MELATONIN PO, Take 1 Tablet by mouth at bedtime as needed (for sleep)., Disp: , Rfl:     MILK THISTLE PO, Take 1 Capsule by mouth every day., Disp: , Rfl:     Sildenafil Citrate (VIAGRA PO), Take 1 Tablet by mouth as needed., Disp: , Rfl:     zolpidem (AMBIEN) 10 MG Tab, Take 20 mg by mouth at bedtime as needed for Sleep., Disp: , Rfl:     multivitamin (THERAGRAN) Tab, Take 1 Tablet by mouth every day., Disp: , Rfl:     predniSONE (DELTASONE) 50  "MG Tab, TAKE 1 TABLET BY MOUTH ONCE DAILY FOR 3 DAYS (Patient not taking: Reported on 10/25/2022), Disp: , Rfl:   ALLERGIES:   Allergies   Allergen Reactions    Codeine Vomiting    Sertraline      Nausea, vomiting    Venlafaxine     Vicodin [Hydrocodone-Acetaminophen] Anxiety     SURGHX:   Past Surgical History:   Procedure Laterality Date    IRRIGATION & DEBRIDEMENT GENERAL Right 5/27/2016    Procedure: IRRIGATION & DEBRIDEMENT - Abdominal wall abscess ;  Surgeon: Suzanne Amato M.D.;  Location: SURGERY [facility];  Service:     EXPLORATORY LAPAROTOMY N/A 5/11/2016    Procedure: EXPLORATORY LAPAROTOMY , SMALL BOWEL RESECTION, RIGHT COLECTOMY;  Surgeon: Carlito Barber M.D.;  Location: SURGERY [facility];  Service:     OTHER CARDIAC SURGERY       SOCHX:  reports that he has never smoked. He has never used smokeless tobacco. He reports current alcohol use. He reports current drug use. Drug: Marijuana.  FH: Reviewed with patient, not pertinent to this visit.       Review of Systems   Musculoskeletal:         Bilateral ankle swelling   All other systems reviewed and are negative.           Objective     /58 (BP Location: Right arm, Patient Position: Sitting, BP Cuff Size: Large adult)   Pulse 93   Temp 36.6 °C (97.9 °F) (Temporal)   Resp 18   Ht 1.778 m (5' 10\")   Wt 118 kg (260 lb)   SpO2 96%   BMI 37.31 kg/m²      Physical Exam  Constitutional:       Appearance: Normal appearance.   HENT:      Head: Normocephalic.      Nose: Nose normal.   Eyes:      Extraocular Movements: Extraocular movements intact.   Cardiovascular:      Rate and Rhythm: Normal rate.      Pulses: Normal pulses.   Pulmonary:      Effort: Pulmonary effort is normal.   Musculoskeletal:      Cervical back: Normal range of motion.      Right foot: Decreased range of motion. Tenderness present. No swelling.      Left foot: Decreased range of motion. Swelling and tenderness present.   Skin:     General: Skin is warm. "   Neurological:      General: No focal deficit present.      Mental Status: He is alert.   Psychiatric:         Mood and Affect: Mood normal.         Behavior: Behavior normal.     Left ankle x-ray:  FINDINGS:     There is no fracture or dislocation.  The visualized osseous structures are in anatomic alignment.  Ankle mortise is symmetric.  The joint spaces are preserved.  Bone mineralization is age-appropriate..  Achilles calcaneal spur.     IMPRESSION:     Soft tissue swelling without acute osseous abnormality.                  Assessment & Plan       1. Acute bilateral ankle pain    - DX-ANKLE 3+ VIEWS LEFT; Future  - ketorolac (TORADOL) injection 30 mg  - ketorolac (TORADOL) injection 30 mg  - predniSONE (DELTASONE) 10 MG Tab; Take 4 Tablets by mouth every day for 4 days.  Dispense: 16 Tablet; Refill: 0    2. Injury of left ankle, initial encounter    - DX-ANKLE 3+ VIEWS LEFT; Future  - ketorolac (TORADOL) injection 30 mg  - ketorolac (TORADOL) injection 30 mg    3. Acute gout of multiple sites, unspecified cause    - predniSONE (DELTASONE) 10 MG Tab; Take 4 Tablets by mouth every day for 4 days.  Dispense: 16 Tablet; Refill: 0    Discussed x-ray results with patient which did not show any fracture or dislocation.  He is given prednisone daily for 4 days for possible acute flare of gout. Recommended RICE (rest, ice, compression, elevation).  May take ibuprofen up to 800 mg every 8 hours as needed for pain relief (advised not to take this with prednisone due to risk of GI irritation).  Advised to apply ice or heat to the area.  May apply topical analgesics or patches for additional pain relief. Discussed treatment plan with patient, he is agreeable and verbalized understanding.  Educated patient on signs and symptoms watch out for, when to return to clinic or go to the ER..     Electronically Signed by JOSH Jaffe

## 2022-12-22 ENCOUNTER — OFFICE VISIT (OUTPATIENT)
Dept: URGENT CARE | Facility: PHYSICIAN GROUP | Age: 42
End: 2022-12-22
Payer: MEDICARE

## 2022-12-22 VITALS
RESPIRATION RATE: 20 BRPM | HEIGHT: 70 IN | WEIGHT: 302 LBS | DIASTOLIC BLOOD PRESSURE: 84 MMHG | OXYGEN SATURATION: 96 % | TEMPERATURE: 98.6 F | BODY MASS INDEX: 43.23 KG/M2 | SYSTOLIC BLOOD PRESSURE: 128 MMHG | HEART RATE: 82 BPM

## 2022-12-22 DIAGNOSIS — M10.9 ACUTE GOUT OF LEFT ANKLE, UNSPECIFIED CAUSE: ICD-10-CM

## 2022-12-22 PROCEDURE — 99214 OFFICE O/P EST MOD 30 MIN: CPT

## 2022-12-22 RX ORDER — PREDNISONE 20 MG/1
40 TABLET ORAL DAILY
Qty: 10 TABLET | Refills: 0 | Status: SHIPPED | OUTPATIENT
Start: 2022-12-22 | End: 2022-12-27

## 2022-12-22 RX ORDER — KETOROLAC TROMETHAMINE 30 MG/ML
30 INJECTION, SOLUTION INTRAMUSCULAR; INTRAVENOUS ONCE
Status: DISCONTINUED | OUTPATIENT
Start: 2022-12-22 | End: 2022-12-22

## 2022-12-22 RX ORDER — PREDNISONE 20 MG/1
TABLET ORAL
COMMUNITY
Start: 2022-11-06

## 2022-12-22 RX ORDER — PREDNISONE 10 MG/1
TABLET ORAL
COMMUNITY
Start: 2022-11-20

## 2022-12-22 ASSESSMENT — FIBROSIS 4 INDEX: FIB4 SCORE: 1.41
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
